# Patient Record
Sex: MALE | Race: BLACK OR AFRICAN AMERICAN
[De-identification: names, ages, dates, MRNs, and addresses within clinical notes are randomized per-mention and may not be internally consistent; named-entity substitution may affect disease eponyms.]

---

## 2022-10-12 ENCOUNTER — NURSE TRIAGE (OUTPATIENT)
Dept: OTHER | Facility: CLINIC | Age: 57
End: 2022-10-12

## 2022-10-12 NOTE — TELEPHONE ENCOUNTER
Location of patient: [address] [place] [address] triage as caller is not with patient. Daughter called but had no info, writer called wife. Received call from Mike Delvalle at [facility] with Red Flag Complaint. Subjective: Caller states \"his feet swell\"     Current Symptoms: Both legs mid calf to feet swell. Tingles off/on. NO open areas or weeping. No chest pain or shortness of breath. No history of heart, liver or kidney failure. No cancer history He has HTN and DM. Onset: 6 months ago; worsening for the past 3 months    Associated Symptoms: NA    Pain Severity: 0/10; not sure he is not with caller    Temperature: no fever     What has been tried: compression socks    LMP: NA Pregnant: NA    Recommended disposition: See in Office Within 2 Weeks has new patient appt in a month, advised caller we move up if possible, if not can take him to [facility]. Care advice provided, patient verbalizes understanding; denies any other questions or concerns; instructed to call back for any new or worsening symptoms. Extended hold for ECC message in chat. Attention Provider: Thank you for allowing me to participate in the care of your patient. The patient was connected to triage in response to information provided to the ECC. Please do not respond through this encounter as the response is not directed to a shared pool.         Reason for Disposition   [1] MILD swelling of both ankles (i.e., pedal edema) AND [2] is a chronic symptom (recurrent or ongoing AND present > 4 weeks)    Protocols used: Leg Swelling and Edema-ADULT-

## 2022-10-16 NOTE — PROGRESS NOTES
Michael Dominguez is an 62 y.o. male who presents to [facility]. He has not seen a physician in at least 10 years. He does not take any medications at this time. Has been seeing ophthalmologist for 4-5 months. He was told he has left retinal detachment and diabetic retinopathy based on clinical findings. Symptoms involve blurry vision and floaters since march. He has been getting eye shots which have been helping but still has persistent blurry vision. He reports his ophthalmologist wants to perform eye surgery and he is requesting medical clearance. In addition, feet and ankles were very swollen all summer long. Better with compression stockings. Patient weight lifts frequently. He takes multiple supplements: GF9. Took hgh prior. Ginko bilboa, Vit D, Testosterone booster, and a focus supplement. /93  POC A1c 7.5    Father with hx HTN and DM    Review of Systems   General/Constitutional:   No headache, fever, fatigue, weight loss or weight gain       Eyes:   + vision changes and blurry vision    Ears:    No pain, loss or changes in hearing     Neck:   No swelling, masses, stiffness, pain, or limited movement     Cardiac:    No chest pain      Respiratory:   No cough or shortness of breath     GI:   No nausea/vomiting, diarrhea, abdominal pain  :   No dysuria or  hematuria    Neurological:   No weakness, HA, dizziness  Skin: No rash     Current Medications  Current medications include:   Current Outpatient Medications   Medication Sig    ginkgo biloba (GINKOBA PO) Take  by mouth. OTHER HGH    OTHER GF9    TESTOSTERONE UNDECANOATE PO Take  by mouth.    vitamin E (AQUA GEMS) 268 mg (400 unit) capsule Take  by mouth daily. GINSENG PO Take  by mouth. cholecalciferol, vitamin D3, (VITAMIN D3 PO) Take  by mouth.    lisinopriL (PRINIVIL, ZESTRIL) 20 mg tablet Take 1 Tablet by mouth daily.  Indications: high blood pressure    metFORMIN (GLUCOPHAGE) 500 mg tablet Take 1 Tablet by mouth two (2) times daily (with meals) for 30 days. No current facility-administered medications for this visit. Allergies  No Known Allergies    Past Medical History  History reviewed. No pertinent past medical history. Past Surgical History   History reviewed. No pertinent surgical history. Family History  Family History   Problem Relation Age of Onset    Pancreatic Cancer Mother     Hypertension Father     High Cholesterol Father     Diabetes Father     Parkinsonism Father     Dementia Father     No Known Problems Sister     No Known Problems Maternal Grandmother     No Known Problems Maternal Grandfather     Heart Attack Paternal Grandmother     Heart Disease Paternal Grandmother     Heart Disease Paternal Grandfather      Social History  Social History     Socioeconomic History    Marital status: UNKNOWN     Spouse name: Not on file    Number of children: Not on file    Years of education: Not on file    Highest education level: Not on file   Occupational History    Not on file   Tobacco Use    Smoking status: Never    Smokeless tobacco: Never   Substance and Sexual Activity    Alcohol use: Yes    Drug use: Never    Sexual activity: Not on file   Other Topics Concern    Not on file   Social History Narrative    Not on file     Social Determinants of Health     Financial Resource Strain: Not on file   Food Insecurity: Not on file   Transportation Needs: Not on file   Physical Activity: Not on file   Stress: Not on file   Social Connections: Not on file   Intimate Partner Violence: Not on file   Housing Stability: Not on file     Immunizations  Immunization History   Administered Date(s) Administered    COVID-19, J&J, (age 18y+), IM, 0.5mL 07/13/2021     Objective   Vital Signs  Visit Vitals  BP (!) 183/93   Pulse 95   Temp 97.8 °F (36.6 °C) (Temporal)   Resp 17   Ht 5' 5\" (1.651 m)   Wt 180 lb (81.6 kg)   SpO2 96%   BMI 29.95 kg/m²     Physical Examination  GEN: No apparent distress. Alert and oriented.   EYES:  Pupils round and reactive to light; extraocular movements are intact. EAR: External ears are normal        LUNGS: Respirations unlabored; clear to auscultation bilaterally  CARDIOVASCULAR: Regular, rate, and rhythm without murmurs, gallops or rubs   ABDOMEN: Soft; nontender; nondistended; normoactive bowel sounds; no masses or organomegaly  NEUROLOGIC:  No focal neurologic deficits. Strength and sensation grossly intact. Coordination and gait grossly intact. EXT: Well perfused. 1+ pitting edema b/l  SKIN: No obvious rashes. Assessment:   Natalya Dial is a 62 y.o. here to establish to care after not seeing a PCP in over 10 years. He reports he has been following with an ophthalmologist for retinopathy and left retinal attachment and takes no medications but does take multiple supplements as listed above. He has been told his eye manifestations may be due to undiagnosed diabetes or HTN. Here in the office, he has a POC A1c of 7.5 and a BP of 183/93 and reports blurry vision. Plan:   1. Type 2 diabetes mellitus with other specified complication, without long-term current use of insulin (HCC)  - Metformin 500mg BID  - POC A1c 7.5 here in clinic. Patient with retinopathy likely secondary to diabetes and HTN    2. Hypertension, unspecified type  - Starting Lisinopril 20mg daily  - BP log for one week  - /93 in clinic with re-check  - Follow up in one week. Consider starting on combo pill lisinopril-hctz if BP consistently above 323 systolic    Counseled re: diet, exercise, healthy lifestyle as it pertains to these new diagnoses of HTN and DMII. We specifically spoke about decreasing sugary beverages, breads, and fast foods which he admits to consuming regularly. 3. Establishing care with new doctor, encounter for  - Drawing labs as below. Will follow up on these, especially kidney function, in terms of starting metformin.  - LIPID PANEL; Future  - METABOLIC PANEL, COMPREHENSIVE;  Future  - AMB POC HEMOGLOBIN J9L  - METABOLIC PANEL, COMPREHENSIVE  - LIPID PANEL    As mentioned above, follow up in 1 week to address DMII and HTN. Also check hearing at this visit as well as screen for memory issues and/or mood disorder as wife was concerned about mood swings and memory issues as of recent. I discussed the aforementioned diagnoses with the patient as well as the plan of care. All questions were answered and an AVS was provided.      I discussed this patient with Dr. Kei Rivera (Attending Physician)    Signed By:  Lonny Braswell, DO

## 2022-10-17 ENCOUNTER — OFFICE VISIT (OUTPATIENT)
Dept: FAMILY MEDICINE CLINIC | Age: 57
End: 2022-10-17
Payer: MEDICAID

## 2022-10-17 VITALS
HEIGHT: 65 IN | WEIGHT: 180 LBS | RESPIRATION RATE: 17 BRPM | DIASTOLIC BLOOD PRESSURE: 93 MMHG | OXYGEN SATURATION: 96 % | HEART RATE: 95 BPM | BODY MASS INDEX: 29.99 KG/M2 | SYSTOLIC BLOOD PRESSURE: 183 MMHG | TEMPERATURE: 97.8 F

## 2022-10-17 DIAGNOSIS — E11.69 TYPE 2 DIABETES MELLITUS WITH OTHER SPECIFIED COMPLICATION, WITHOUT LONG-TERM CURRENT USE OF INSULIN (HCC): ICD-10-CM

## 2022-10-17 DIAGNOSIS — I10 HYPERTENSION, UNSPECIFIED TYPE: Primary | ICD-10-CM

## 2022-10-17 DIAGNOSIS — Z76.89 ESTABLISHING CARE WITH NEW DOCTOR, ENCOUNTER FOR: ICD-10-CM

## 2022-10-17 LAB
COMMENT, HOLDF: NORMAL
HBA1C MFR BLD HPLC: 7.5 %
SAMPLES BEING HELD,HOLD: NORMAL

## 2022-10-17 PROCEDURE — 99214 OFFICE O/P EST MOD 30 MIN: CPT

## 2022-10-17 PROCEDURE — 3051F HG A1C>EQUAL 7.0%<8.0%: CPT

## 2022-10-17 PROCEDURE — 83036 HEMOGLOBIN GLYCOSYLATED A1C: CPT

## 2022-10-17 RX ORDER — LISINOPRIL 20 MG/1
20 TABLET ORAL DAILY
Qty: 30 TABLET | Refills: 0 | Status: SHIPPED | OUTPATIENT
Start: 2022-10-17 | End: 2022-10-24

## 2022-10-17 RX ORDER — VITAMIN E 268 MG
CAPSULE ORAL DAILY
COMMUNITY

## 2022-10-17 RX ORDER — METFORMIN HYDROCHLORIDE 500 MG/1
500 TABLET ORAL 2 TIMES DAILY WITH MEALS
Qty: 60 TABLET | Refills: 0 | Status: SHIPPED | OUTPATIENT
Start: 2022-10-17 | End: 2022-11-16

## 2022-10-17 RX ORDER — METFORMIN HYDROCHLORIDE 500 MG/1
500 TABLET, EXTENDED RELEASE ORAL
Qty: 30 TABLET | Refills: 0 | Status: SHIPPED | OUTPATIENT
Start: 2022-10-17 | End: 2022-10-17 | Stop reason: SINTOL

## 2022-10-17 NOTE — PATIENT INSTRUCTIONS
Please get a blood pressure cuff from your local pharmacy and check your blood pressure twice in the morning and twice in the evening. Check it once, wait 2 minutes, and check again (you will write down 4 total readings per day). Your A1c was also in the diabetic range. We are starting metformin today. Please take two pills per day (one in the morning and one in the evening). We will see you back in a week to go over your blood pressure log, labs, and to see if we need to change your medications.

## 2022-10-18 DIAGNOSIS — I10 HYPERTENSION, UNSPECIFIED TYPE: Primary | ICD-10-CM

## 2022-10-18 LAB
ALBUMIN SERPL-MCNC: 3.2 G/DL (ref 3.5–5)
ALBUMIN/GLOB SERPL: 0.8 {RATIO} (ref 1.1–2.2)
ALP SERPL-CCNC: 79 U/L (ref 45–117)
ALT SERPL-CCNC: 44 U/L (ref 12–78)
ANION GAP SERPL CALC-SCNC: 7 MMOL/L (ref 5–15)
AST SERPL-CCNC: 32 U/L (ref 15–37)
BILIRUB SERPL-MCNC: 0.4 MG/DL (ref 0.2–1)
BUN SERPL-MCNC: 27 MG/DL (ref 6–20)
BUN/CREAT SERPL: 13 (ref 12–20)
CALCIUM SERPL-MCNC: 8.6 MG/DL (ref 8.5–10.1)
CHLORIDE SERPL-SCNC: 107 MMOL/L (ref 97–108)
CHOLEST SERPL-MCNC: 301 MG/DL
CO2 SERPL-SCNC: 28 MMOL/L (ref 21–32)
CREAT SERPL-MCNC: 2.09 MG/DL (ref 0.7–1.3)
GLOBULIN SER CALC-MCNC: 4.1 G/DL (ref 2–4)
GLUCOSE SERPL-MCNC: 191 MG/DL (ref 65–100)
HDLC SERPL-MCNC: 42 MG/DL
HDLC SERPL: 7.2 {RATIO} (ref 0–5)
LDLC SERPL CALC-MCNC: 231.8 MG/DL (ref 0–100)
POTASSIUM SERPL-SCNC: 4.1 MMOL/L (ref 3.5–5.1)
PROT SERPL-MCNC: 7.3 G/DL (ref 6.4–8.2)
SODIUM SERPL-SCNC: 142 MMOL/L (ref 136–145)
TRIGL SERPL-MCNC: 136 MG/DL (ref ?–150)
VLDLC SERPL CALC-MCNC: 27.2 MG/DL

## 2022-10-19 ENCOUNTER — TELEPHONE (OUTPATIENT)
Dept: FAMILY MEDICINE CLINIC | Age: 57
End: 2022-10-19

## 2022-10-19 DIAGNOSIS — R94.4 DECREASED GFR: Primary | ICD-10-CM

## 2022-10-19 DIAGNOSIS — I10 HYPERTENSION, UNSPECIFIED TYPE: ICD-10-CM

## 2022-10-19 RX ORDER — ROSUVASTATIN CALCIUM 20 MG/1
20 TABLET, COATED ORAL
Qty: 30 TABLET | Refills: 0 | Status: SHIPPED | OUTPATIENT
Start: 2022-10-19

## 2022-10-19 NOTE — TELEPHONE ENCOUNTER
I have spoken with Mr. Saul Mcgee via telephone for an 8-minute call ending at 6:07 PM on 10/19 regarding his labwork below. We discussed the major findings: elevated cholesterol and poor kidney function. I informed him that I will be sending crestor 20mg to his pharmacy to be taken once daily. He will also be picking up his blood pressure cuff when he gets his crestor. Patient has an appointment on Monday, Oct 24th with Dr. Chayito Sawyer. I informed patient on phone that we would likely get more testing with urine microalbumin cr ratio. In addition, will likely add on amlodipine to his new medication of lisinopril. Patient was informed of all this and all questions were answered. Labs as below:     The 10-year ASCVD risk score (Theresa CARRANZA, et al., 2019) is: 15.8%   Lab Results   Component Value Date/Time    Sodium 142 10/17/2022 04:00 PM    Potassium 4.1 10/17/2022 04:00 PM    Chloride 107 10/17/2022 04:00 PM    CO2 28 10/17/2022 04:00 PM    Anion gap 7 10/17/2022 04:00 PM    Glucose 191 (H) 10/17/2022 04:00 PM    BUN 27 (H) 10/17/2022 04:00 PM    Creatinine 2.09 (H) 10/17/2022 04:00 PM    BUN/Creatinine ratio 13 10/17/2022 04:00 PM    Calcium 8.6 10/17/2022 04:00 PM    Bilirubin, total 0.4 10/17/2022 04:00 PM    Alk.  phosphatase 79 10/17/2022 04:00 PM    Protein, total 7.3 10/17/2022 04:00 PM    Albumin 3.2 (L) 10/17/2022 04:00 PM    Globulin 4.1 (H) 10/17/2022 04:00 PM    A-G Ratio 0.8 (L) 10/17/2022 04:00 PM    ALT (SGPT) 44 10/17/2022 04:00 PM    AST (SGOT) 32 10/17/2022 04:00 PM      Lab Results   Component Value Date/Time    Cholesterol, total 301 (H) 10/17/2022 04:00 PM    HDL Cholesterol 42 10/17/2022 04:00 PM    LDL, calculated 231.8 (H) 10/17/2022 04:00 PM    VLDL, calculated 27.2 10/17/2022 04:00 PM    Triglyceride 136 10/17/2022 04:00 PM    CHOL/HDL Ratio 7.2 (H) 10/17/2022 04:00 PM    Last Point of Care HGB A1C  Hemoglobin A1c (POC)   Date Value Ref Range Status   10/17/2022 7.5 % Final

## 2022-10-24 ENCOUNTER — OFFICE VISIT (OUTPATIENT)
Dept: FAMILY MEDICINE CLINIC | Age: 57
End: 2022-10-24
Payer: COMMERCIAL

## 2022-10-24 VITALS
SYSTOLIC BLOOD PRESSURE: 160 MMHG | HEIGHT: 65 IN | BODY MASS INDEX: 29.82 KG/M2 | TEMPERATURE: 97.8 F | WEIGHT: 179 LBS | RESPIRATION RATE: 17 BRPM | OXYGEN SATURATION: 98 % | DIASTOLIC BLOOD PRESSURE: 88 MMHG | HEART RATE: 93 BPM

## 2022-10-24 DIAGNOSIS — I10 HYPERTENSION, UNSPECIFIED TYPE: Primary | ICD-10-CM

## 2022-10-24 DIAGNOSIS — R94.4 DECREASED GFR: ICD-10-CM

## 2022-10-24 DIAGNOSIS — E11.69 TYPE 2 DIABETES MELLITUS WITH OTHER SPECIFIED COMPLICATION, WITHOUT LONG-TERM CURRENT USE OF INSULIN (HCC): ICD-10-CM

## 2022-10-24 PROCEDURE — 3079F DIAST BP 80-89 MM HG: CPT

## 2022-10-24 PROCEDURE — 3051F HG A1C>EQUAL 7.0%<8.0%: CPT

## 2022-10-24 PROCEDURE — 99214 OFFICE O/P EST MOD 30 MIN: CPT

## 2022-10-24 PROCEDURE — 3077F SYST BP >= 140 MM HG: CPT

## 2022-10-24 RX ORDER — LISINOPRIL 40 MG/1
40 TABLET ORAL DAILY
Qty: 90 TABLET | Refills: 2 | Status: SHIPPED | OUTPATIENT
Start: 2022-10-24

## 2022-10-24 NOTE — PROGRESS NOTES
[address] [address]   Office [phone], Fax [phone]    Subjective:     Chief Complaint   Patient presents with    Hypertension         HPI:  Ameya Meléndez is a 62 y.o. male with a history of HTN, T2DM, CKD3B, that presents for: Follow Up for HTN and DM2    HTN:  - Started on Lisinopril 20mg daily after elevated pressures noted in clinic last week. - Unable to take home BP as he has not picked up BP cuff yet  - denies any headaches, dizziness, SOB, chest pain    DM2:   - Recently diagnosed, A1c of 7.5 last week, recently started on Metformin 500mg BID  - follows with ophtho for DM retinopathy and detached retina   - experienced mild diarrhea with Metformin which is now improving, no other issues. Health Maintenance:  Health Maintenance Due   Topic Date Due    Hepatitis C Screening  Never done    Pneumococcal 0-64 years (1 - PCV) Never done    DTaP/Tdap/Td series (1 - Tdap) Never done    Colorectal Cancer Screening Combo  Never done    Shingrix Vaccine Age 50> (1 of 2) Never done    COVID-19 Vaccine (2 - Booster for [employer] series) 09/07/2021    Flu Vaccine (1) Never done          Past Medical Hx  I personally reviewed. History reviewed. No pertinent past medical history. SocHx   I personally reviewed.   Social History     Socioeconomic History    Marital status: UNKNOWN     Spouse name: Not on file    Number of children: Not on file    Years of education: Not on file    Highest education level: Not on file   Occupational History    Not on file   Tobacco Use    Smoking status: Never    Smokeless tobacco: Never   Substance and Sexual Activity    Alcohol use: Yes    Drug use: Never    Sexual activity: Not on file   Other Topics Concern    Not on file   Social History Narrative    Not on file     Social Determinants of Health     Financial Resource Strain: Not on file   Food Insecurity: Not on file   Transportation Needs: Not on file   Physical Activity: Not on file   Stress: Not on file   Social Connections: Not on file   Intimate Partner Violence: Not on file   Housing Stability: Not on file        Allergies  I personally reviewed. No Known Allergies     Medications  I personally reviewed. Current Outpatient Medications on File Prior to Visit   Medication Sig Dispense Refill    OTHER EU9 for focus and memory      rosuvastatin (CRESTOR) 20 mg tablet Take 1 Tablet by mouth nightly. Indications: high cholesterol and high triglycerides 30 Tablet 0    ginkgo biloba (GINKOBA PO) Take  by mouth. OTHER HGH      OTHER GF9      TESTOSTERONE UNDECANOATE PO Take  by mouth.      vitamin E (AQUA GEMS) 268 mg (400 unit) capsule Take  by mouth daily. GINSENG PO Take  by mouth. cholecalciferol, vitamin D3, (VITAMIN D3 PO) Take  by mouth.      metFORMIN (GLUCOPHAGE) 500 mg tablet Take 1 Tablet by mouth two (2) times daily (with meals) for 30 days. 60 Tablet 0     No current facility-administered medications on file prior to visit. ROS:   Review of Systems   Constitutional:  Negative for chills, fever and weight loss. Eyes:  Negative for blurred vision. Respiratory:  Negative for shortness of breath. Cardiovascular:  Negative for chest pain and palpitations. Gastrointestinal:  Negative for abdominal pain, diarrhea, nausea and vomiting. Genitourinary:  Negative for dysuria and frequency. Skin:  Negative for rash. Neurological:  Negative for dizziness, tingling, loss of consciousness and weakness. Objective:   Vitals  I personally reviewed. Visit Vitals  BP (!) 160/88   Pulse 93   Temp 97.8 °F (36.6 °C) (Temporal)   Resp 17   Ht 5' 5\" (1.651 m)   Wt 179 lb (81.2 kg)   SpO2 98%   BMI 29.79 kg/m²        Physical Exam:    Physical Exam  Constitutional:       Appearance: Normal appearance. HENT:      Head: Normocephalic and atraumatic.       Right Ear: External ear normal.      Left Ear: External ear normal.   Cardiovascular:      Rate and Rhythm: Normal rate and regular rhythm. Pulses: Normal pulses. Heart sounds: Normal heart sounds. No murmur heard. Pulmonary:      Effort: Pulmonary effort is normal. No respiratory distress. Breath sounds: Normal breath sounds. No stridor. No wheezing. Abdominal:      General: Abdomen is flat. Palpations: Abdomen is soft. Tenderness: There is no abdominal tenderness. Skin:     General: Skin is warm and dry. Findings: No rash. Neurological:      General: No focal deficit present. Mental Status: He is alert and oriented to person, place, and time. Assessment/Plan:     60yo male with history of HTN and CKD3b presents for follow up for HTN as well as newly diagnosed T2DM. Diagnoses and all orders for this visit:    1. Hypertension, unspecified type  Assessment & Plan:  Recently started on Lisinopril 20, however pressures remain above goal. No home numbers to compare. Will increase to Lisinopril 40mg today. Patient instructed to purchase home BP cuff and keep log. Plan to follow up in 2 weeks with log. BMP today after starting Lisinopril, and urine microalbumin. Orders:  -     lisinopriL (PRINIVIL, ZESTRIL) 40 mg tablet; Take 1 Tablet by mouth daily.  -     MICROALBUMIN, UR, RAND W/ MICROALB/CREAT RATIO; Future    2. Decreased GFR  -     METABOLIC PANEL, BASIC; Future  -     MICROALBUMIN, UR, RAND W/ MICROALB/CREAT RATIO; Future    3. Type 2 diabetes mellitus with other specified complication, without long-term current use of insulin (HCC)  Assessment & Plan:  A1c 7.5 recently diagnosed, started on Metformin and now tolerating well. Continue Metformin, will not increase dose at this time. Will plan follow up for A1c check in 2 months, and consider increase dose at that time if necessary. Follow-up and Dispositions    Return in about 2 weeks (around 11/7/2022) for Follow Up for HTN, increased lisinopril .           Pt was discussed with Dr Maricarmen Grewal (attending physician). I have reviewed patient medical and social history and medications. I have reviewed pertinent labs results and other data. I have discussed the diagnosis with the patient and the intended plan as seen in the above orders. The patient has received an after-visit summary and questions were answered concerning future plans. I have discussed medication side effects and warnings with the patient as well.     Sushila Steel MD  Resident [facility]  10/27/22

## 2022-10-24 NOTE — PROGRESS NOTES
Chief Complaint   Patient presents with    Hypertension     1. Have you been to the ER, urgent care clinic since your last visit? Hospitalized since your last visit? N/A    2. Have you seen or consulted any other health care providers outside of the [address] [place] [address] since your last visit? Include any pap smears or colon screening.  N/A

## 2022-10-25 LAB
ANION GAP SERPL CALC-SCNC: 5 MMOL/L (ref 5–15)
BUN SERPL-MCNC: 38 MG/DL (ref 6–20)
BUN/CREAT SERPL: 17 (ref 12–20)
CALCIUM SERPL-MCNC: 9.4 MG/DL (ref 8.5–10.1)
CHLORIDE SERPL-SCNC: 108 MMOL/L (ref 97–108)
CO2 SERPL-SCNC: 28 MMOL/L (ref 21–32)
CREAT SERPL-MCNC: 2.18 MG/DL (ref 0.7–1.3)
CREAT UR-MCNC: 231 MG/DL
GLUCOSE SERPL-MCNC: 165 MG/DL (ref 65–100)
MICROALBUMIN UR-MCNC: 630 MG/DL
MICROALBUMIN/CREAT UR-RTO: 2727 MG/G (ref 0–30)
POTASSIUM SERPL-SCNC: 4.3 MMOL/L (ref 3.5–5.1)
SODIUM SERPL-SCNC: 141 MMOL/L (ref 136–145)

## 2022-10-27 PROBLEM — E11.9 DIABETES MELLITUS (HCC): Status: ACTIVE | Noted: 2022-10-27

## 2022-10-27 PROBLEM — I10 HYPERTENSION: Status: ACTIVE | Noted: 2022-10-27

## 2022-10-27 PROBLEM — R94.4 DECREASED GFR: Status: ACTIVE | Noted: 2022-10-27

## 2022-10-28 NOTE — ASSESSMENT & PLAN NOTE
A1c 7.5 recently diagnosed, started on Metformin and now tolerating well. Continue Metformin, will not increase dose at this time. Will plan follow up for A1c check in 2 months, and consider increase dose at that time if necessary.

## 2022-10-28 NOTE — ASSESSMENT & PLAN NOTE
Recently started on Lisinopril 20, however pressures remain above goal. No home numbers to compare. Will increase to Lisinopril 40mg today. Patient instructed to purchase home BP cuff and keep log. Plan to follow up in 2 weeks with log. BMP today after starting Lisinopril, and urine microalbumin.

## 2022-11-10 ENCOUNTER — OFFICE VISIT (OUTPATIENT)
Dept: FAMILY MEDICINE CLINIC | Age: 57
End: 2022-11-10
Payer: COMMERCIAL

## 2022-11-10 VITALS
RESPIRATION RATE: 16 BRPM | OXYGEN SATURATION: 97 % | SYSTOLIC BLOOD PRESSURE: 189 MMHG | WEIGHT: 180 LBS | HEART RATE: 79 BPM | DIASTOLIC BLOOD PRESSURE: 90 MMHG | BODY MASS INDEX: 29.99 KG/M2 | HEIGHT: 65 IN

## 2022-11-10 DIAGNOSIS — I10 HYPERTENSION, UNSPECIFIED TYPE: Primary | ICD-10-CM

## 2022-11-10 DIAGNOSIS — R79.89 ELEVATED SERUM CREATININE: ICD-10-CM

## 2022-11-10 DIAGNOSIS — R80.9 ALBUMINURIA: ICD-10-CM

## 2022-11-10 DIAGNOSIS — E11.69 TYPE 2 DIABETES MELLITUS WITH OTHER SPECIFIED COMPLICATION, WITHOUT LONG-TERM CURRENT USE OF INSULIN (HCC): ICD-10-CM

## 2022-11-10 DIAGNOSIS — R94.4 DECREASED GFR: ICD-10-CM

## 2022-11-10 LAB
ANION GAP SERPL CALC-SCNC: 5 MMOL/L (ref 5–15)
BUN SERPL-MCNC: 50 MG/DL (ref 6–20)
BUN/CREAT SERPL: 27 (ref 12–20)
CALCIUM SERPL-MCNC: 9.3 MG/DL (ref 8.5–10.1)
CHLORIDE SERPL-SCNC: 110 MMOL/L (ref 97–108)
CO2 SERPL-SCNC: 27 MMOL/L (ref 21–32)
CREAT SERPL-MCNC: 1.82 MG/DL (ref 0.7–1.3)
GLUCOSE SERPL-MCNC: 105 MG/DL (ref 65–100)
POTASSIUM SERPL-SCNC: 5.2 MMOL/L (ref 3.5–5.1)
SODIUM SERPL-SCNC: 142 MMOL/L (ref 136–145)

## 2022-11-10 PROCEDURE — 3051F HG A1C>EQUAL 7.0%<8.0%: CPT

## 2022-11-10 PROCEDURE — 3079F DIAST BP 80-89 MM HG: CPT

## 2022-11-10 PROCEDURE — 99214 OFFICE O/P EST MOD 30 MIN: CPT

## 2022-11-10 PROCEDURE — 3077F SYST BP >= 140 MM HG: CPT

## 2022-11-10 RX ORDER — PREDNISOLONE ACETATE 10 MG/ML
SUSPENSION/ DROPS OPHTHALMIC
COMMUNITY
Start: 2022-11-02

## 2022-11-10 RX ORDER — OFLOXACIN 3 MG/ML
SOLUTION/ DROPS OPHTHALMIC
COMMUNITY
Start: 2022-11-02

## 2022-11-10 RX ORDER — DORZOLAMIDE HYDROCHLORIDE AND TIMOLOL MALEATE 20; 5 MG/ML; MG/ML
SOLUTION/ DROPS OPHTHALMIC
COMMUNITY
Start: 2022-11-08

## 2022-11-10 RX ORDER — GLIPIZIDE 5 MG/1
2.5 TABLET ORAL ONCE
Qty: 30 TABLET | Refills: 3 | Status: SHIPPED | OUTPATIENT
Start: 2022-11-10 | End: 2022-11-10

## 2022-11-10 RX ORDER — ATROPINE SULFATE 10 MG/ML
SOLUTION/ DROPS OPHTHALMIC
COMMUNITY
Start: 2022-11-08

## 2022-11-10 RX ORDER — AMLODIPINE BESYLATE 5 MG/1
5 TABLET ORAL DAILY
Qty: 30 TABLET | Refills: 3 | Status: SHIPPED | OUTPATIENT
Start: 2022-11-10

## 2022-11-10 NOTE — PROGRESS NOTES
Devyn Sharif is a 62 y.o. male    Chief Complaint   Patient presents with    Hypertension       1. Have you been to the ER, urgent care clinic since your last visit? Hospitalized since your last visit? No  2. Have you seen or consulted any other health care providers outside of the [address] [place] [address] since your last visit? Include any pap smears or colon screening.  No    Visit Vitals  BP (!) 189/90 (BP 1 Location: Left upper arm, BP Patient Position: Sitting)   Pulse 79   Resp 16   Ht 5' 5\" (1.651 m)   Wt 180 lb (81.6 kg)   SpO2 97%   BMI 29.95 kg/m²     3 most recent PHQ Screens 10/24/2022   Little interest or pleasure in doing things Not at all   Feeling down, depressed, irritable, or hopeless Not at all   Total Score PHQ 2 0     Health Maintenance Due   Topic Date Due    Hepatitis C Screening  Never done    Pneumococcal 0-64 years (1 - PCV) Never done    Foot Exam Q1  Never done    Eye Exam Retinal or Dilated  Never done    Hepatitis B Vaccine (1 of 3 - Risk 3-dose series) Never done    DTaP/Tdap/Td series (1 - Tdap) Never done    Colorectal Cancer Screening Combo  Never done    Shingrix Vaccine Age 50> (1 of 2) Never done    COVID-19 Vaccine (2 - Booster for [facility] series) 09/07/2021    Flu Vaccine (1) Never done

## 2022-11-10 NOTE — PROGRESS NOTES
Subjective   CC:   Chief Complaint   Patient presents with    Hypertension      Sparkle Tracey is a 62 y.o. male with hx of DM, HLD, and HTN (all diagnosed one month prior) who presents for follow up for poorly controlled HTN. Was seen two weeks ago with an increase in lisinopril from 20mg to 40mg. He comes in today with his BP log as below showing persistently elevated pressures despite being on lisinopril 40mg. He denies any headaches, chest pain, difficulty breathing, leg swelling, back pain today. Pt had eye surgery on Monday for retinal detachment. No complications. Will have follow up on next Wednesday. 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 Of note, he and his wife report increased agitation and outbursts of anger as of recent. Patient reports he feels stressed and cooped up not being able to go to work. No fever, HA, CP, cough, SOB, n/v, abd pain, GI/urinary issues noted. Past Medical History  No past medical history on file. Current Medications  Current Outpatient Medications   Medication Sig Dispense Refill    prednisoLONE acetate (PRED FORTE) 1 % ophthalmic suspension 1 DROP LEFT EYE FOUR TIMES A DAY 30 DAYS      ofloxacin (OCUFLOX) 0.3 % ophthalmic solution 1 DROP INTO LEFT EYE OPHTHALMIC FOUR TIMES A DAY 7 DAY(S)      dorzolamide-timoloL (COSOPT) 22.3-6.8 mg/mL ophthalmic solution INSTILL 1 DROP LEFT EYE TWICE A DAY 30 DAYS      atropine 1 % ophthalmic solution       amLODIPine (NORVASC) 5 mg tablet Take 1 Tablet by mouth daily. 30 Tablet 3    glipiZIDE (GLUCOTROL) 5 mg tablet Take 0.5 Tablets by mouth once for 1 dose. 30 Tablet 3    OTHER EU9 for focus and memory      lisinopriL (PRINIVIL, ZESTRIL) 40 mg tablet Take 1 Tablet by mouth daily. 90 Tablet 2    rosuvastatin (CRESTOR) 20 mg tablet Take 1 Tablet by mouth nightly. Indications: high cholesterol and high triglycerides 30 Tablet 0    ginkgo biloba (GINKOBA PO) Take  by mouth. OTHER HGH      OTHER GF9      TESTOSTERONE UNDECANOATE PO Take  by mouth.      vitamin E (AQUA GEMS) 268 mg (400 unit) capsule Take  by mouth daily. GINSENG PO Take  by mouth. cholecalciferol, vitamin D3, (VITAMIN D3 PO) Take  by mouth.      metFORMIN (GLUCOPHAGE) 500 mg tablet Take 1 Tablet by mouth two (2) times daily (with meals) for 30 days. 60 Tablet 0       Objective   Vital Signs  Visit Vitals  BP (!) 189/90 (BP 1 Location: Left upper arm, BP Patient Position: Sitting)   Pulse 79   Resp 16   Ht 5' 5\" (1.651 m)   Wt 180 lb (81.6 kg)   SpO2 97%   BMI 29.95 kg/m²     Recheck of BP was 926 systolic. Labs  Lab Results   Component Value Date/Time    Sodium 141 10/24/2022 03:36 PM    Potassium 4.3 10/24/2022 03:36 PM    Chloride 108 10/24/2022 03:36 PM    CO2 28 10/24/2022 03:36 PM    Anion gap 5 10/24/2022 03:36 PM    Glucose 165 (H) 10/24/2022 03:36 PM    BUN 38 (H) 10/24/2022 03:36 PM    Creatinine 2.18 (H) 10/24/2022 03:36 PM    BUN/Creatinine ratio 17 10/24/2022 03:36 PM    Calcium 9.4 10/24/2022 03:36 PM        Lab Results   Component Value Date/Time    Cholesterol, total 301 (H) 10/17/2022 04:00 PM    HDL Cholesterol 42 10/17/2022 04:00 PM    LDL, calculated 231.8 (H) 10/17/2022 04:00 PM    VLDL, calculated 27.2 10/17/2022 04:00 PM    Triglyceride 136 10/17/2022 04:00 PM    CHOL/HDL Ratio 7.2 (H) 10/17/2022 04:00 PM      Physical Examination  Physical Exam   Cardio: Regular rate/rhythm, no murmurs  Pulm: Clear b/l, no wheezing, no crackles, no ronchi  Abd: Soft, non-tender, non-distended  Lower Ext: 1+ pitting edema on b/l LE, no calf tenderness, sensation intact b/l    Diabetic foot exam nml    Assessment and Plan   Gama Martinez is a 62 y.o. who is here for follow up for poorly controlled HTN. BP persistently elevated on lisinopril 40mg, adding amlodipine 5mg daily today.  Pt is to continue to log BP readings twice daily. Follow up in 2 weeks    Kidney function is also poor with repeat BMP 2 weeks ago again showing elevated Cr at 2.18, GFR at 34, and microalbumin at 630. Although we don't have 3 months of data, patient seems to have CKD stage 3b. BP and DM control is of utmost importance, specifically HTN. Referring to nephrology to establish care and consider alternative or more aggressive means of better controlling BP and/or diabetes in setting of kidney disease. Patient was also encouraged to stop taking supplements considering lack of regulation and possibly increased stress on kidneys. DMII: POC a1c was 7.5 on 10/17. Patient was started on Metformin 500 BID. However, in light of persistently poor kidney function as seen on BMP's, discontinuing metformin and starting glipizide today at 2.5mg daily. Will likely titrate up to 5mg each morning at subsequent visit. Can consider adding a GLP-1 agonist in the future. Provided with information regarding healthy eating and low-moderate intensity cardio exercise. Spoke extensively about stress reduction including breathing techniques and outdoor walks. Patient feels going back to work will be helpful which I agree. Will re-assess at next visit. 1. Hypertension, unspecified type  - amLODIPine (NORVASC) 5 mg tablet; Take 1 Tablet by mouth daily. Dispense: 30 Tablet; Refill: 3  - REFERRAL TO NEPHROLOGY  - METABOLIC PANEL, BASIC; Future  - METABOLIC PANEL, BASIC    2. Decreased GFR  - REFERRAL TO NEPHROLOGY  - METABOLIC PANEL, BASIC; Future  - METABOLIC PANEL, BASIC    3. Albuminuria  - REFERRAL TO NEPHROLOGY  - METABOLIC PANEL, BASIC; Future  - METABOLIC PANEL, BASIC    4. Elevated serum creatinine  - REFERRAL TO NEPHROLOGY  - METABOLIC PANEL, BASIC; Future  - METABOLIC PANEL, BASIC    5. Type 2 diabetes mellitus with other specified complication, without long-term current use of insulin (HCC)  - glipiZIDE (GLUCOTROL) 5 mg tablet;  Take 0.5 Tablets by mouth once for 1 dose. Dispense: 30 Tablet; Refill: 3  - REFERRAL TO NEPHROLOGY  - METABOLIC PANEL, BASIC; Future  -  DIABETES FOOT EXAM  - METABOLIC PANEL, BASIC     Urgent consultation with the nearest Emergency Department is strongly recommended if condition worsens. Patient counseled on precautions including but not limited to: persistent BP readings above 132 systolic, headaches, acute vision changes, chest pain, shortness of breath.     I discussed this patient with Dr. Mary Wray (Attending Physician)     Signed By:  Saul Calderón DO  Family Medicine Resident

## 2022-11-15 NOTE — PROGRESS NOTES
I saw and evaluated the patient, performing the key elements of the service. I discussed the findings, assessment and plan with the resident and agree with the resident's findings and plan as documented in the resident's note. Will d/c metformin and start glipizide given GFR close to 30.  Referral to nephrologist.

## 2022-11-16 DIAGNOSIS — I10 HYPERTENSION, UNSPECIFIED TYPE: ICD-10-CM

## 2022-11-16 NOTE — TELEPHONE ENCOUNTER
Eileen Hollis  Pt called requesting his med refill for lisinopril and rosuvastatin.   Please and thank you      -Nadine Patel

## 2022-11-17 DIAGNOSIS — I10 HYPERTENSION, UNSPECIFIED TYPE: ICD-10-CM

## 2022-11-22 RX ORDER — ROSUVASTATIN CALCIUM 20 MG/1
20 TABLET, COATED ORAL
Qty: 30 TABLET | Refills: 0 | Status: SHIPPED | OUTPATIENT
Start: 2022-11-22

## 2022-11-23 ENCOUNTER — OFFICE VISIT (OUTPATIENT)
Dept: FAMILY MEDICINE CLINIC | Age: 57
End: 2022-11-23
Payer: COMMERCIAL

## 2022-11-23 VITALS
WEIGHT: 181 LBS | RESPIRATION RATE: 18 BRPM | HEART RATE: 93 BPM | OXYGEN SATURATION: 98 % | SYSTOLIC BLOOD PRESSURE: 189 MMHG | TEMPERATURE: 98.5 F | HEIGHT: 65 IN | BODY MASS INDEX: 30.16 KG/M2 | DIASTOLIC BLOOD PRESSURE: 86 MMHG

## 2022-11-23 DIAGNOSIS — E11.69 TYPE 2 DIABETES MELLITUS WITH OTHER SPECIFIED COMPLICATION, WITHOUT LONG-TERM CURRENT USE OF INSULIN (HCC): ICD-10-CM

## 2022-11-23 DIAGNOSIS — F41.9 MILD ANXIETY: ICD-10-CM

## 2022-11-23 DIAGNOSIS — R94.4 DECREASED GFR: ICD-10-CM

## 2022-11-23 DIAGNOSIS — I10 HYPERTENSION, UNSPECIFIED TYPE: Primary | ICD-10-CM

## 2022-11-23 PROCEDURE — 3077F SYST BP >= 140 MM HG: CPT

## 2022-11-23 PROCEDURE — 99214 OFFICE O/P EST MOD 30 MIN: CPT

## 2022-11-23 PROCEDURE — 3079F DIAST BP 80-89 MM HG: CPT

## 2022-11-23 PROCEDURE — 3051F HG A1C>EQUAL 7.0%<8.0%: CPT

## 2022-11-23 RX ORDER — AMLODIPINE BESYLATE 10 MG/1
10 TABLET ORAL DAILY
Qty: 30 TABLET | Refills: 0 | Status: SHIPPED | OUTPATIENT
Start: 2022-11-23

## 2022-11-23 RX ORDER — GLIPIZIDE 5 MG/1
5 TABLET ORAL
Qty: 90 TABLET | Refills: 3 | Status: SHIPPED | OUTPATIENT
Start: 2022-11-23

## 2022-11-23 RX ORDER — ROSUVASTATIN CALCIUM 20 MG/1
20 TABLET, COATED ORAL
Qty: 30 TABLET | Refills: 0 | OUTPATIENT
Start: 2022-11-23

## 2022-11-23 RX ORDER — HYDROCHLOROTHIAZIDE 25 MG/1
25 TABLET ORAL DAILY
Qty: 30 TABLET | Refills: 3 | Status: SHIPPED | OUTPATIENT
Start: 2022-11-23

## 2022-11-23 NOTE — PROGRESS NOTES
Subjective   CC:   Chief Complaint   Patient presents with    Follow Up Chronic Condition     HTN and DM follow up     Minnette Hodgkin is a 62 y.o. male who presents for HTN and DM follow-up. On 11/10, metformin was stopped and glipizide was started at 2.5mg daily. For HTN, was started on amlodipine 5mg daily. No issues with any medications thus-far. Has been checking BP and consistently gets systolic readings in the 541'W. Did not bring log with him today. Reports he has been less stressed recently as he has gotten back to exercising more frequently. Of note, labs from last visit showed an increased potassium level of 5.2. No fever, HA, CP, new vision issues. Past Medical History  No past medical history on file. Current Medications  Current Outpatient Medications   Medication Sig Dispense Refill    hydroCHLOROthiazide (HYDRODIURIL) 25 mg tablet Take 1 Tablet by mouth daily. 30 Tablet 3    amLODIPine (NORVASC) 10 mg tablet Take 1 Tablet by mouth daily. 30 Tablet 0    rosuvastatin (CRESTOR) 20 mg tablet TAKE 1 TABLET BY MOUTH NIGHTLY. INDICATIONS: HIGH CHOLESTEROL AND HIGH TRIGLYCERIDES 30 Tablet 0    prednisoLONE acetate (PRED FORTE) 1 % ophthalmic suspension 1 DROP LEFT EYE FOUR TIMES A DAY 30 DAYS      atropine 1 % ophthalmic solution       amLODIPine (NORVASC) 5 mg tablet Take 1 Tablet by mouth daily. 30 Tablet 3    lisinopriL (PRINIVIL, ZESTRIL) 40 mg tablet Take 1 Tablet by mouth daily. 90 Tablet 2    TESTOSTERONE UNDECANOATE PO Take  by mouth.      vitamin E (AQUA GEMS) 268 mg (400 unit) capsule Take  by mouth daily. cholecalciferol, vitamin D3, (VITAMIN D3 PO) Take  by mouth.       ofloxacin (OCUFLOX) 0.3 % ophthalmic solution 1 DROP INTO LEFT EYE OPHTHALMIC FOUR TIMES A DAY 7 DAY(S) (Patient not taking: Reported on 11/23/2022)      dorzolamide-timoloL (COSOPT) 22.3-6.8 mg/mL ophthalmic solution INSTILL 1 DROP LEFT EYE TWICE A DAY 30 DAYS (Patient not taking: Reported on 11/23/2022)      OTHER EU9 for focus and memory (Patient not taking: Reported on 11/23/2022)      ginkgo biloba (GINKOBA PO) Take  by mouth. (Patient not taking: Reported on 11/23/2022)      OTHER HGH (Patient not taking: Reported on 11/23/2022)      OTHER GF9 (Patient not taking: Reported on 11/23/2022)      GINSENG PO Take  by mouth. (Patient not taking: Reported on 11/23/2022)         Objective   Vital Signs  Visit Vitals  BP (!) 189/86 (BP 1 Location: Right arm, BP Patient Position: Sitting, BP Cuff Size: Large adult)   Pulse 93   Temp 98.5 °F (36.9 °C) (Oral)   Resp 18   Ht 5' 5\" (1.651 m)   Wt 181 lb (82.1 kg)   SpO2 98%   BMI 30.12 kg/m²     Labs  Lab Results   Component Value Date/Time    Sodium 142 11/10/2022 10:25 AM    Potassium 5.2 (H) 11/10/2022 10:25 AM    Chloride 110 (H) 11/10/2022 10:25 AM    CO2 27 11/10/2022 10:25 AM    Anion gap 5 11/10/2022 10:25 AM    Glucose 105 (H) 11/10/2022 10:25 AM    BUN 50 (H) 11/10/2022 10:25 AM    Creatinine 1.82 (H) 11/10/2022 10:25 AM    BUN/Creatinine ratio 27 (H) 11/10/2022 10:25 AM    Calcium 9.3 11/10/2022 10:25 AM        Lab Results   Component Value Date/Time    Cholesterol, total 301 (H) 10/17/2022 04:00 PM    HDL Cholesterol 42 10/17/2022 04:00 PM    LDL, calculated 231.8 (H) 10/17/2022 04:00 PM    VLDL, calculated 27.2 10/17/2022 04:00 PM    Triglyceride 136 10/17/2022 04:00 PM    CHOL/HDL Ratio 7.2 (H) 10/17/2022 04:00 PM      PHQ-9: 0  JACI-7: 9    Physical Examination  Cardio: Regular rate/rhythm, no murmurs  Pulm: Clear b/l, no wheezing, no crackles, no ronchi  Lower Ext: No edema, no calf tenderness, sensation intact b/l    Assessment and Plan   Ramón Barreto is a 62 y.o. who is here for HTN and DM follow up. BP is poorly controlled and still severely elevated despite being now on lisinopril 40mg as well as amlodipine 5mg daily. Repeating BMP today considering elevated potassium of 5.2 at last visit (likely 2/2 lisinopril and poor kidney function).  Pt awaiting nephro appt which is on Feb 1st.    1. Hypertension, unspecified type: Remains poorly controlled. Adding HCTZ 25mg daily and increasing amlodipine from 5 to 10mg. - METABOLIC PANEL, BASIC; Future  - hydroCHLOROthiazide (HYDRODIURIL) 25 mg tablet; Take 1 Tablet by mouth daily. Dispense: 30 Tablet; Refill: 3  - amLODIPine (NORVASC) 10 mg tablet; Take 1 Tablet by mouth daily. Dispense: 30 Tablet; Refill: 0  - METABOLIC PANEL, BASIC    2. Decreased GFR  - METABOLIC PANEL, BASIC; Future  - METABOLIC PANEL, BASIC    3. Type 2 diabetes mellitus with other specified complication, without long-term current use of insulin (HCC)  - Increased Glipizide from 2.5mg daily to 5mg daily  - METABOLIC PANEL, BASIC; Future  - METABOLIC PANEL, BASIC    4. Mild anxiety: JACI-7 of 9 indicating mild anxiety. Encouraged to continue working on stress relief with breath work, regular exercise. Discussed starting SSRI in the future. Concern for possible bipolar/anger issues. - REFERRAL TO PSYCHIATRY     Follow up in 2 weeks. At this visit, repeat BMP and evaluate BP/log. Patient is counseled to return to the office if symptoms do not improve as expected. Urgent consultation with the nearest Emergency Department is strongly recommended if condition worsens.     I discussed this patient with Dr. Alfredo Ash (Attending Physician)     Signed By:  Chanelle Gonzalez DO  Family Medicine Resident

## 2022-11-23 NOTE — PROGRESS NOTES
Identified Patient with two Patient identifiers (Name and ). Two Patient Identifiers confirmed. Reviewed record in preparation for visit and have obtained necessary documentation. Chief Complaint   Patient presents with    Follow Up Chronic Condition     HTN and DM follow up     Vitals:    22 1021 22 1025   BP: (!) 198/91 (!) 189/86   BP 1 Location: Right arm Right arm   BP Patient Position: Sitting Sitting   BP Cuff Size: Large adult Large adult   Pulse: 93    Temp: 98.5 °F (36.9 °C)    TempSrc: Oral    Resp: 18    Height: 5' 5\" (1.651 m)    Weight: 181 lb (82.1 kg)    SpO2: 98%         1. Have you been to the ER, urgent care clinic since your last visit? Hospitalized since your last visit? No    2. Have you seen or consulted any other health care providers outside of the [address] [place] [address] since your last visit? Include any pap smears or colon screening.  No

## 2022-11-23 NOTE — PROGRESS NOTES
I reviewed with the resident the medical history and the resident's findings on the physical examination. I discussed with the resident the patient's diagnosis and concur with the plan. Follow up hypertension. Diagnosed 1.5 months ago. Not controlled in office or at home. Asymptomatic from severe hypertension today. Hyperkalemia and inc lisinopril last visit - repeat BMP. Increase amlodipine. Add HCTZ. Follow up in 2 weeks with another BMP, needs secondary HTN work up if no response to therapy. Follow up Diabetes. Diagnosed 1.5 months ago. A1c 7.5%. On glipizide, increasing today.

## 2022-11-23 NOTE — PATIENT INSTRUCTIONS
Blood Pressure:  - Lisinopril 10mg daily   - HCTZ 25 mg daily  - Amlodipine 10mg (two of your 5mg pills)    Diabetes:  - Glipizide 5mg daily (one full pill)

## 2022-11-24 LAB
ANION GAP SERPL CALC-SCNC: 5 MMOL/L (ref 5–15)
BUN SERPL-MCNC: 47 MG/DL (ref 6–20)
BUN/CREAT SERPL: 26 (ref 12–20)
CALCIUM SERPL-MCNC: 8.9 MG/DL (ref 8.5–10.1)
CHLORIDE SERPL-SCNC: 109 MMOL/L (ref 97–108)
CO2 SERPL-SCNC: 26 MMOL/L (ref 21–32)
CREAT SERPL-MCNC: 1.81 MG/DL (ref 0.7–1.3)
GLUCOSE SERPL-MCNC: 101 MG/DL (ref 65–100)
POTASSIUM SERPL-SCNC: 4.8 MMOL/L (ref 3.5–5.1)
SODIUM SERPL-SCNC: 140 MMOL/L (ref 136–145)

## 2022-12-07 ENCOUNTER — OFFICE VISIT (OUTPATIENT)
Dept: FAMILY MEDICINE CLINIC | Age: 57
End: 2022-12-07
Payer: COMMERCIAL

## 2022-12-07 VITALS
HEIGHT: 65 IN | TEMPERATURE: 98 F | RESPIRATION RATE: 13 BRPM | HEART RATE: 90 BPM | WEIGHT: 173.4 LBS | BODY MASS INDEX: 28.89 KG/M2 | SYSTOLIC BLOOD PRESSURE: 167 MMHG | OXYGEN SATURATION: 98 % | DIASTOLIC BLOOD PRESSURE: 80 MMHG

## 2022-12-07 DIAGNOSIS — I10 HYPERTENSION, UNSPECIFIED TYPE: Primary | ICD-10-CM

## 2022-12-07 DIAGNOSIS — E78.2 MIXED HYPERLIPIDEMIA: ICD-10-CM

## 2022-12-07 DIAGNOSIS — E11.69 TYPE 2 DIABETES MELLITUS WITH OTHER SPECIFIED COMPLICATION, WITHOUT LONG-TERM CURRENT USE OF INSULIN (HCC): ICD-10-CM

## 2022-12-07 RX ORDER — ROSUVASTATIN CALCIUM 20 MG/1
20 TABLET, COATED ORAL
Qty: 90 TABLET | Refills: 1 | Status: SHIPPED | OUTPATIENT
Start: 2022-12-07

## 2022-12-07 RX ORDER — HYDROCHLOROTHIAZIDE 25 MG/1
25 TABLET ORAL DAILY
Qty: 90 TABLET | Refills: 1 | Status: SHIPPED | OUTPATIENT
Start: 2022-12-07

## 2022-12-07 RX ORDER — AMLODIPINE BESYLATE 10 MG/1
10 TABLET ORAL DAILY
Qty: 90 TABLET | Refills: 1 | Status: SHIPPED | OUTPATIENT
Start: 2022-12-07

## 2022-12-07 NOTE — PROGRESS NOTES
Daiana Giordano is a 62 y.o. male    Chief Complaint   Patient presents with    Hypertension    Diabetes       1. Have you been to the ER, urgent care clinic since your last visit? Hospitalized since your last visit? No  2. Have you seen or consulted any other health care providers outside of the [address] [place] [address] since your last visit? Include any pap smears or colon screening. No    There were no vitals taken for this visit.   3 most recent PHQ Screens 12/7/2022   Little interest or pleasure in doing things Not at all   Feeling down, depressed, irritable, or hopeless Not at all   Total Score PHQ 2 0     Health Maintenance Due   Topic Date Due    Hepatitis C Screening  Never done    Pneumococcal 0-64 years (1 - PCV) Never done    Eye Exam Retinal or Dilated  Never done    Hepatitis B Vaccine (1 of 3 - Risk 3-dose series) Never done    DTaP/Tdap/Td series (1 - Tdap) Never done    Colorectal Cancer Screening Combo  Never done    Shingrix Vaccine Age 50> (1 of 2) Never done    COVID-19 Vaccine (2 - Booster for [employer] series) 09/07/2021    Flu Vaccine (1) Never done

## 2022-12-07 NOTE — PROGRESS NOTES
Subjective   Gordy Queen is a 62 y.o. male who presents for Hypertension and Diabetes    HPI  - Patient is here for follow up of his HTN and T2DM. - He was last seen on 12/7/22 by Dr. Nakia Ziegler and his amlodipine was increased from 5 to 10mg daily. He was also started on HCTZ 25 mg daily and is also on lisinopril 40mg daily.   - He reports that he continues to take these medications and brought a BP log into the office which shows a range of 392B-135T systolic and 08Q-52C diastolic.     - He never picked up the HCTZ, says it was not sent to the pharmacy. Has not been taking it. Review of Systems   Negative except per HPI. Medical History  No past medical history on file. Medications  Current Outpatient Medications   Medication Sig    hydroCHLOROthiazide (HYDRODIURIL) 25 mg tablet Take 1 Tablet by mouth daily. rosuvastatin (CRESTOR) 20 mg tablet Take 1 Tablet by mouth nightly. Indications: high cholesterol and high triglycerides    amLODIPine (NORVASC) 10 mg tablet Take 1 Tablet by mouth daily. glipiZIDE (GLUCOTROL) 5 mg tablet Take 1 Tablet by mouth Daily (before breakfast). prednisoLONE acetate (PRED FORTE) 1 % ophthalmic suspension 1 DROP LEFT EYE FOUR TIMES A DAY 30 DAYS    ofloxacin (OCUFLOX) 0.3 % ophthalmic solution     dorzolamide-timoloL (COSOPT) 22.3-6.8 mg/mL ophthalmic solution     atropine 1 % ophthalmic solution     lisinopriL (PRINIVIL, ZESTRIL) 40 mg tablet Take 1 Tablet by mouth daily. ginkgo biloba (GINKOBA PO) Take  by mouth. TESTOSTERONE UNDECANOATE PO Take  by mouth.    vitamin E (AQUA GEMS) 268 mg (400 unit) capsule Take  by mouth daily. GINSENG PO Take  by mouth. cholecalciferol, vitamin D3, (VITAMIN D3 PO) Take  by mouth.     OTHER EU9 for focus and memory (Patient not taking: Reported on 11/23/2022)    OTHER HGH (Patient not taking: Reported on 11/23/2022)    OTHER GF9 (Patient not taking: Reported on 11/23/2022)     No current facility-administered medications for this visit. Immunizations   Immunization History   Administered Date(s) Administered    COVID-19, [employer], (age 18y+), IM, 0.5mL 07/13/2021       Allergies   No Known Allergies    Objective   Vital Signs  Visit Vitals  BP (!) 167/80 (BP 1 Location: Right arm, BP Patient Position: Sitting, BP Cuff Size: Large adult)   Pulse 90   Temp 98 °F (36.7 °C)   Resp 13   Ht 5' 5\" (1.651 m)   Wt 173 lb 6.4 oz (78.7 kg)   SpO2 98%   BMI 28.86 kg/m²       Physical Examination  Physical Exam  HENT:      Mouth/Throat:      Mouth: Mucous membranes are moist.      Pharynx: Oropharynx is clear. Cardiovascular:      Rate and Rhythm: Normal rate and regular rhythm. Pulses: Normal pulses. Heart sounds: Normal heart sounds. Pulmonary:      Effort: Pulmonary effort is normal.      Breath sounds: Normal breath sounds. Skin:     General: Skin is warm and dry. Capillary Refill: Capillary refill takes less than 2 seconds. Neurological:      Mental Status: He is alert and oriented to person, place, and time. Assessment and Plan   Trever Morley is a 62 y.o. male who presents for Hypertension and Diabetes      1. Hypertension, unspecified type. Still poorly controlled. Had not started HCTZ medication.  - BP initiailly 167/80, decreased to 145/70 on re-check. - Resent HCTZ to pharmacy. Also sent 90-day supply of amlodipine 10mg. - hydroCHLOROthiazide (HYDRODIURIL) 25 mg tablet; Take 1 Tablet by mouth daily. Dispense: 90 Tablet; Refill: 1  - amLODIPine (NORVASC) 10 mg tablet; Take 1 Tablet by mouth daily. Dispense: 90 Tablet; Refill: 1     2. Type 2 Diabetes Mellitus. Previous A1c 7.5, poorly controlled. - continue glipizide. Has refills at the pharmacy.   - RTC in 2 months for follow up visit. 3. Mixed Hyperlipidemia. Refilled crestor. Will recheck lipids at follow up visit. - rosuvastatin (CRESTOR) 20 mg tablet; Take 1 Tablet by mouth nightly.  Indications: high cholesterol and high triglycerides Dispense: 90 Tablet; Refill: 1    - Patient will return to clinic in 2 months for HTN, T2DM follow up. Will obtain BMP, A1c at that visit. Return in about 1 month (around 1/7/2023). Pt was discussed with Dr. Jake Sanchez MD (attending physician). I have reviewed patient medical and social history and medications. I have reviewed pertinent labs results and other data. I have discussed the diagnosis with the patient and the intended plan as seen in the above orders. The patient has received an after-visit summary and questions were answered concerning future plans. I have discussed medication side effects and warnings with the patient as well.     German Leal MD  PGY1 Resident, [facility]  12/07/22

## 2023-03-10 ENCOUNTER — OFFICE VISIT (OUTPATIENT)
Dept: FAMILY MEDICINE CLINIC | Age: 58
End: 2023-03-10

## 2023-03-10 VITALS
SYSTOLIC BLOOD PRESSURE: 148 MMHG | BODY MASS INDEX: 29.09 KG/M2 | DIASTOLIC BLOOD PRESSURE: 77 MMHG | OXYGEN SATURATION: 96 % | HEART RATE: 95 BPM | WEIGHT: 174.8 LBS | TEMPERATURE: 98.2 F

## 2023-03-10 DIAGNOSIS — N18.30 STAGE 3 CHRONIC KIDNEY DISEASE, UNSPECIFIED WHETHER STAGE 3A OR 3B CKD (HCC): ICD-10-CM

## 2023-03-10 DIAGNOSIS — E11.3533: ICD-10-CM

## 2023-03-10 DIAGNOSIS — Z23 ENCOUNTER FOR IMMUNIZATION: ICD-10-CM

## 2023-03-10 DIAGNOSIS — I10 HYPERTENSION, UNSPECIFIED TYPE: ICD-10-CM

## 2023-03-10 DIAGNOSIS — H25.9 AGE-RELATED CATARACT OF BOTH EYES, UNSPECIFIED AGE-RELATED CATARACT TYPE: Primary | ICD-10-CM

## 2023-03-10 DIAGNOSIS — Z01.818 PREOP EXAMINATION: ICD-10-CM

## 2023-03-10 DIAGNOSIS — R00.0 TACHYCARDIA: ICD-10-CM

## 2023-03-10 PROBLEM — N18.9 CHRONIC KIDNEY DISEASE: Status: ACTIVE | Noted: 2022-10-27

## 2023-03-10 RX ORDER — CHLORTHALIDONE 25 MG/1
25 TABLET ORAL DAILY
Qty: 30 TABLET | Refills: 0 | Status: SHIPPED | OUTPATIENT
Start: 2023-03-10

## 2023-03-10 NOTE — ASSESSMENT & PLAN NOTE
Chronic, uncontrolled   - Start Chlorthalidone 25mg daily (current Crcl 40)  - Will need electrolytes rechecked in 2-4 weeks  - Counseling continuing lifestyle modifications (exercise, low salt diet)  - Follow up 2 weeks

## 2023-03-10 NOTE — PROGRESS NOTES
[address] [facility] [address] [place] [address]   Office [phone]  Fax [phone]       Preoperative Evaluation    Date of Exam: 3/10/2023    Trinh Loja is a 62 y.o. male (:1965) who presents for preoperative evaluation. Procedure/Surgery: Vitrectomy, removal of cataract, external IOL placement    Date of Procedure/Surgery: 3/20/23    Surgeon: Dr. Les Cooley: [facility]    Primary Physician: Gray Fothergill, DO    Latex Allergy: no    Recent use of: No recent use of aspirin (ASA), NSAIDS or steroids    Tetanus up to date: tetanus status unknown to the patient    Anesthesia Complications: None    History of abnormal bleeding : None    History of Blood Transfusions: no    Health Care Directive or Living Will: no    Functional Capacity: able to climb a flight of stairs without chest pain or severe SOB    HTN:  Reviewed home BP lo-150/90  Meds: Norvasc 10. Lisinopril 40. Has not started HCTZ  ROS: Denies chest pain on exertion, FLETCHER, lower extremity swelling, palpitations, cough, orthostatic dizziness/lightheadedness  Diet: cut out salt  Exercise: does weight lifting and cardio daily  Tobacco use: No  Alcohol use: social  NSAID use: No          Allergies- reviewed:   No Known Allergies      Medications- reviewed:   Current Outpatient Medications   Medication Sig    chlorthalidone (HYGROTON) 25 mg tablet Take 1 Tablet by mouth daily. rosuvastatin (CRESTOR) 20 mg tablet Take 1 Tablet by mouth nightly. Indications: high cholesterol and high triglycerides    amLODIPine (NORVASC) 10 mg tablet Take 1 Tablet by mouth daily. glipiZIDE (GLUCOTROL) 5 mg tablet Take 1 Tablet by mouth Daily (before breakfast). lisinopriL (PRINIVIL, ZESTRIL) 40 mg tablet Take 1 Tablet by mouth daily. No current facility-administered medications for this visit. Past Medical History- reviewed:  History reviewed. No pertinent past medical history.       Past Surgical History- reviewed:   Alcohol use: social  History reviewed. No pertinent surgical history. Social History- reviewed:  Social History     Socioeconomic History    Marital status: UNKNOWN     Spouse name: Not on file    Number of children: Not on file    Years of education: Not on file    Highest education level: Not on file   Occupational History    Not on file   Tobacco Use    Smoking status: Never    Smokeless tobacco: Never   Substance and Sexual Activity    Alcohol use: Yes    Drug use: Never    Sexual activity: Not on file   Other Topics Concern    Not on file   Social History Narrative    Not on file     Social Determinants of Health     Financial Resource Strain: Not on file   Food Insecurity: Not on file   Transportation Needs: Not on file   Physical Activity: Not on file   Stress: Not on file   Social Connections: Not on file   Intimate Partner Violence: Not on file   Housing Stability: Not on file         Immunizations- reviewed:   Immunization History   Administered Date(s) Administered    COVID-19, J&J, (age 18y+), IM, 0.5mL 07/13/2021    Tdap 03/10/2023           REVIEW OF SYSTEMS:  Review of Systems -   General ROS: negative for - chills or fever  Respiratory ROS: negative for - cough, shortness of breath or wheezing  Cardiovascular ROS: negative for - chest pain or palpitations  Gastrointestinal ROS: negative for - abdominal pain, constipation, diarrhea, nausea, vomiting  Neurological ROS: negative for - dizziness or headaches  Dermatological ROS: negative for - rash or skin lesion changes        EXAM:   Visit Vitals  BP (!) 148/77   Pulse 95   Temp 98.2 °F (36.8 °C) (Oral)   Wt 174 lb 12.8 oz (79.3 kg)   SpO2 96%   BMI 29.09 kg/m²       General: Patient alert and oriented and in NAD  HEENT: PER/EOMI, no conjunctival pallor or scleral icterus. Heart: Regular rate and rhythm, No murmurs, rubs or gallops.    Lungs: Clear to auscultation bilaterally, no wheezing, rales or rhonchi  Abd: +BS, non-tender, non-distended  Ext: No edema  Skin: No rashes or lesions noted on exposed skin  Neuro: AAOx3  Psych: Appropriate mood and affect        DIAGNOSTICS:   1. EKG: NSR with non voltage LVH  2. Labs: ordered repeat kidney function  Lab Results   Component Value Date/Time    Sodium 140 11/23/2022 11:50 AM    Potassium 4.8 11/23/2022 11:50 AM    Chloride 109 (H) 11/23/2022 11:50 AM    CO2 26 11/23/2022 11:50 AM    Anion gap 5 11/23/2022 11:50 AM    Glucose 101 (H) 11/23/2022 11:50 AM    BUN 47 (H) 11/23/2022 11:50 AM    Creatinine 1.81 (H) 11/23/2022 11:50 AM    BUN/Creatinine ratio 26 (H) 11/23/2022 11:50 AM    eGFR 43 (L) 11/23/2022 11:50 AM    Calcium 8.9 11/23/2022 11:50 AM           IMPRESSION:     Pt presents for preoperative evaluation for eye surgery and is  low risk for this low risk surgery based on previous lab work - Revised Cardiac Risk Index score is 3.9% for risk of major cardiac event. Chronic conditions that may impact the pre-op, intra-op, and post-op courses include: uncontrolled HTN, diabetes. Assessment and Plan:  Diagnoses and all orders for this visit:    1. Age-related cataract of both eyes, unspecified age-related cataract type    2. 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 Encounter for immunization  -     TDAP, BOOSTRIX, (AGE 10 YRS+), IM  -     IA IMMUNIZ ADMIN,1 SINGLE/COMB VAC/TOXOID    4. Preop examination    5.  Hypertension, unspecified type  Assessment & Plan:  Chronic, uncontrolled   - Start Chlorthalidone 25mg daily (current Crcl 40)  - Will need electrolytes rechecked in 2-4 weeks  - Counseling continuing lifestyle modifications (exercise, low salt diet)  - Follow up 2 weeks      Orders:  -     chlorthalidone (HYGROTON) 25 mg tablet; Take 1 Tablet by mouth daily.  -     RENAL FUNCTION PANEL; Future    6. Tachycardia  -     AMB POC EKG ROUTINE W/ 12 LEADS, INTER & REP    7. Stage 3 chronic kidney disease, unspecified whether stage 3a or 3b CKD (HCC)  Assessment & Plan:  Chronic. Macro albuminuria  - Would likely benefit from SGLT2i - discuss at next visit  - Recheck kidney function panel  - If kidney function still CKD3B will need further work up and referral to nephrology      Preop forms will be faxed once renal panel resulted      Follow-up and Dispositions    Return in about 2 weeks (around 3/24/2023) for Blood pressure recheck. Then follow up for annual physical .         I have discussed the aforementioned diagnoses and plan with the patient in detail. I have provided information in person and/or in AVS. All questions answered prior to discharge. I discussed this patient with Dr. Jacob Trejo (Attending Physician).     Signed By:  Suzette Shaikh MD     Family Medicine Resident

## 2023-03-10 NOTE — ASSESSMENT & PLAN NOTE
Chronic.  Macro albuminuria  - Would likely benefit from SGLT2i - discuss at next visit  - Recheck kidney function panel  - If kidney function still CKD3B will need further work up and referral to nephrology

## 2023-03-11 LAB
ALBUMIN SERPL-MCNC: 3.5 G/DL (ref 3.5–5)
ANION GAP SERPL CALC-SCNC: 5 MMOL/L (ref 5–15)
BUN SERPL-MCNC: 25 MG/DL (ref 6–20)
BUN/CREAT SERPL: 13 (ref 12–20)
CALCIUM SERPL-MCNC: 9 MG/DL (ref 8.5–10.1)
CHLORIDE SERPL-SCNC: 110 MMOL/L (ref 97–108)
CO2 SERPL-SCNC: 28 MMOL/L (ref 21–32)
CREAT SERPL-MCNC: 1.89 MG/DL (ref 0.7–1.3)
EST. AVERAGE GLUCOSE BLD GHB EST-MCNC: 128 MG/DL
GLUCOSE SERPL-MCNC: 219 MG/DL (ref 65–100)
HBA1C MFR BLD: 6.1 % (ref 4–5.6)
PHOSPHATE SERPL-MCNC: 2.8 MG/DL (ref 2.6–4.7)
POTASSIUM SERPL-SCNC: 4.1 MMOL/L (ref 3.5–5.1)
SODIUM SERPL-SCNC: 143 MMOL/L (ref 136–145)

## 2023-03-14 PROBLEM — N18.32 STAGE 3B CHRONIC KIDNEY DISEASE (HCC): Status: ACTIVE | Noted: 2022-10-27

## 2023-06-13 RX ORDER — CHLORTHALIDONE 25 MG/1
25 TABLET ORAL DAILY
Qty: 30 TABLET | Refills: 1 | Status: SHIPPED | OUTPATIENT
Start: 2023-06-13

## 2023-12-21 NOTE — PATIENT INSTRUCTIONS
Please start taking amlodipine 5mg daily    Please take 1/2 glipizide tablet daily each morning for your diabetes. STOP taking your metformin. We will see you back in 2 weeks. Here is my diet and exercise blurb:  Please focus on eating a healthy diet and exercising regularly. This can help you lose weight, lower your sugar and cholesterol levels, and overall make you healthier to decrease your change of developing disease and early death. Diet:  - Drink less sugary beverages and alcohol (soda, sweet tea, beer, wine, hard seltzer, liquor)  - Drink more water (100 ounces per day)  - Eat less simple carbohydrates/sugar (sweets, white bread, baked goods)  - Eat more complex carbohydrates (whole grain bread, brown rice)  - Eat less processed foods (snacks, chips, etc.)  - Eat more whole foods (vegetables, grass-fed or free-range animal products)  - Good guidelines to follow are the \"Mediterranean Diet\"  - Snack less in-between meals. This gives your body a chance to use more body fat for fuel. Exercise:  - Try to do some form of activity at least 5 times per week  - A little bit is better than nothing.  Start with 10 minutes per day  - Some examples of exercises to incorporate into your daily schedule:   - Walk 0.5-1 mile outdoors (10-25 minutes)   - The \"Kitchen Workout\" (5-10 minutes)  - 10 bodyweight squats  - 10 calf raises (go up on tippy toes)  - 10 Push-Offs (push-up while standing w/ hands on corner of counter top)  - 10 Lunges Right Leg Forward  - 10 Lunges Left Leg Forward Quality 226: Preventive Care And Screening: Tobacco Use: Screening And Cessation Intervention: Patient screened for tobacco use and is an ex/non-smoker Quality 130: Documentation Of Current Medications In The Medical Record: Current Medications Documented Detail Level: Detailed Quality 431: Preventive Care And Screening: Unhealthy Alcohol Use - Screening: Patient not identified as an unhealthy alcohol user when screened for unhealthy alcohol use using a systematic screening method Quality 402: Tobacco Use And Help With Quitting Among Adolescents: Patient screened for tobacco and never smoked

## 2024-07-09 ENCOUNTER — OFFICE VISIT (OUTPATIENT)
Age: 59
End: 2024-07-09
Payer: MEDICAID

## 2024-07-09 VITALS
SYSTOLIC BLOOD PRESSURE: 170 MMHG | OXYGEN SATURATION: 96 % | RESPIRATION RATE: 18 BRPM | HEART RATE: 68 BPM | HEIGHT: 65 IN | WEIGHT: 172 LBS | DIASTOLIC BLOOD PRESSURE: 87 MMHG | TEMPERATURE: 98.4 F | BODY MASS INDEX: 28.66 KG/M2

## 2024-07-09 DIAGNOSIS — E11.3533: ICD-10-CM

## 2024-07-09 DIAGNOSIS — Z13.31 POSITIVE SCREENING FOR DEPRESSION ON 9-ITEM PATIENT HEALTH QUESTIONNAIRE (PHQ-9): ICD-10-CM

## 2024-07-09 DIAGNOSIS — I10 ESSENTIAL (PRIMARY) HYPERTENSION: Primary | ICD-10-CM

## 2024-07-09 DIAGNOSIS — N18.32 STAGE 3B CHRONIC KIDNEY DISEASE (HCC): ICD-10-CM

## 2024-07-09 PROCEDURE — 99214 OFFICE O/P EST MOD 30 MIN: CPT

## 2024-07-09 RX ORDER — INSULIN GLARGINE 100 [IU]/ML
6 INJECTION, SOLUTION SUBCUTANEOUS NIGHTLY
Qty: 5 ADJUSTABLE DOSE PRE-FILLED PEN SYRINGE | Refills: 1 | Status: SHIPPED | OUTPATIENT
Start: 2024-07-09

## 2024-07-09 RX ORDER — ATORVASTATIN CALCIUM 80 MG/1
80 TABLET, FILM COATED ORAL DAILY
COMMUNITY
Start: 2024-06-18

## 2024-07-09 RX ORDER — CARVEDILOL 6.25 MG/1
6.25 TABLET ORAL 2 TIMES DAILY
COMMUNITY
Start: 2024-06-18 | End: 2024-07-09

## 2024-07-09 RX ORDER — CLOPIDOGREL BISULFATE 75 MG/1
75 TABLET ORAL DAILY
COMMUNITY
Start: 2024-06-18

## 2024-07-09 RX ORDER — CARVEDILOL 12.5 MG/1
12.5 TABLET ORAL 2 TIMES DAILY
Qty: 180 TABLET | Refills: 0 | Status: SHIPPED | OUTPATIENT
Start: 2024-07-09

## 2024-07-09 RX ORDER — CLONIDINE HYDROCHLORIDE 0.2 MG/1
0.2 TABLET ORAL 2 TIMES DAILY
COMMUNITY
Start: 2024-06-18

## 2024-07-09 RX ORDER — PEN NEEDLE, DIABETIC 31 G X1/4"
1 NEEDLE, DISPOSABLE MISCELLANEOUS DAILY
Qty: 100 EACH | Refills: 3 | Status: SHIPPED | OUTPATIENT
Start: 2024-07-09

## 2024-07-09 RX ORDER — ASPIRIN 81 MG/1
81 TABLET, COATED ORAL DAILY
COMMUNITY
Start: 2024-06-18

## 2024-07-09 RX ORDER — DAPAGLIFLOZIN 10 MG/1
10 TABLET, FILM COATED ORAL EVERY MORNING
COMMUNITY

## 2024-07-09 ASSESSMENT — PATIENT HEALTH QUESTIONNAIRE - PHQ9
4. FEELING TIRED OR HAVING LITTLE ENERGY: MORE THAN HALF THE DAYS
SUM OF ALL RESPONSES TO PHQ QUESTIONS 1-9: 16
9. THOUGHTS THAT YOU WOULD BE BETTER OFF DEAD, OR OF HURTING YOURSELF: NOT AT ALL
10. IF YOU CHECKED OFF ANY PROBLEMS, HOW DIFFICULT HAVE THESE PROBLEMS MADE IT FOR YOU TO DO YOUR WORK, TAKE CARE OF THINGS AT HOME, OR GET ALONG WITH OTHER PEOPLE: EXTREMELY DIFFICULT
3. TROUBLE FALLING OR STAYING ASLEEP: NEARLY EVERY DAY
SUM OF ALL RESPONSES TO PHQ QUESTIONS 1-9: 16
7. TROUBLE CONCENTRATING ON THINGS, SUCH AS READING THE NEWSPAPER OR WATCHING TELEVISION: NOT AT ALL
6. FEELING BAD ABOUT YOURSELF - OR THAT YOU ARE A FAILURE OR HAVE LET YOURSELF OR YOUR FAMILY DOWN: MORE THAN HALF THE DAYS
SUM OF ALL RESPONSES TO PHQ9 QUESTIONS 1 & 2: 6
5. POOR APPETITE OR OVEREATING: NOT AT ALL
2. FEELING DOWN, DEPRESSED OR HOPELESS: NEARLY EVERY DAY
1. LITTLE INTEREST OR PLEASURE IN DOING THINGS: NEARLY EVERY DAY
8. MOVING OR SPEAKING SO SLOWLY THAT OTHER PEOPLE COULD HAVE NOTICED. OR THE OPPOSITE, BEING SO FIGETY OR RESTLESS THAT YOU HAVE BEEN MOVING AROUND A LOT MORE THAN USUAL: NEARLY EVERY DAY

## 2024-07-09 ASSESSMENT — ANXIETY QUESTIONNAIRES
3. WORRYING TOO MUCH ABOUT DIFFERENT THINGS: MORE THAN HALF THE DAYS
1. FEELING NERVOUS, ANXIOUS, OR ON EDGE: MORE THAN HALF THE DAYS
5. BEING SO RESTLESS THAT IT IS HARD TO SIT STILL: NOT AT ALL
2. NOT BEING ABLE TO STOP OR CONTROL WORRYING: MORE THAN HALF THE DAYS
7. FEELING AFRAID AS IF SOMETHING AWFUL MIGHT HAPPEN: MORE THAN HALF THE DAYS
6. BECOMING EASILY ANNOYED OR IRRITABLE: NEARLY EVERY DAY
4. TROUBLE RELAXING: NOT AT ALL
GAD7 TOTAL SCORE: 11
IF YOU CHECKED OFF ANY PROBLEMS ON THIS QUESTIONNAIRE, HOW DIFFICULT HAVE THESE PROBLEMS MADE IT FOR YOU TO DO YOUR WORK, TAKE CARE OF THINGS AT HOME, OR GET ALONG WITH OTHER PEOPLE: EXTREMELY DIFFICULT

## 2024-07-10 ENCOUNTER — TELEPHONE (OUTPATIENT)
Age: 59
End: 2024-07-10

## 2024-07-10 LAB
ANION GAP SERPL CALC-SCNC: 4 MMOL/L (ref 5–15)
BUN SERPL-MCNC: 36 MG/DL (ref 6–20)
BUN/CREAT SERPL: 18 (ref 12–20)
CALCIUM SERPL-MCNC: 9 MG/DL (ref 8.5–10.1)
CHLORIDE SERPL-SCNC: 101 MMOL/L (ref 97–108)
CHOLEST SERPL-MCNC: 98 MG/DL
CO2 SERPL-SCNC: 29 MMOL/L (ref 21–32)
CREAT SERPL-MCNC: 2.02 MG/DL (ref 0.7–1.3)
CREAT UR-MCNC: 96.5 MG/DL
GLUCOSE SERPL-MCNC: 484 MG/DL (ref 65–100)
HDLC SERPL-MCNC: 29 MG/DL
HDLC SERPL: 3.4 (ref 0–5)
LDLC SERPL CALC-MCNC: 47.4 MG/DL (ref 0–100)
MICROALBUMIN UR-MCNC: 225 MG/DL
MICROALBUMIN/CREAT UR-RTO: 2332 MG/G (ref 0–30)
POTASSIUM SERPL-SCNC: 4.7 MMOL/L (ref 3.5–5.1)
SODIUM SERPL-SCNC: 134 MMOL/L (ref 136–145)
TRIGL SERPL-MCNC: 108 MG/DL
VLDLC SERPL CALC-MCNC: 21.6 MG/DL

## 2024-07-10 NOTE — TELEPHONE ENCOUNTER
Unable to process the PA due to  conflict. Patients  is different then what is on his insurance card. [employer] message sent to patient after I called patient and was unable to leave a voicemail.

## 2024-07-12 NOTE — TELEPHONE ENCOUNTER
I spoke with Sahara from [facility] who stated that since Dr. Angelo is not enrolled in the medicaid system he is unable to send in prescription for the medicaid patients. She stated that because of the doctor not being enrolled it may have triggered the need for a prior authorization. She also stated that if a different doctor who is enrolled in the medicaid system send over the prescription then it may or may not eliminate the need for a prior authorization.     I did inform her that Liz tried to do a prior auth but was getting pushed back due to the patient date of birth. Sahara stated that medicaid and the pharmacy knows about this issue and it has been an ongoing process. She stated to \"Change the birthday to 1965 if its not going through.\"    She did give me a number for you to call if there is any issues. [phone] she stated to follow the prompts until you to the prior authorization department.

## 2024-07-12 NOTE — PROGRESS NOTES
[facility] Residency Attending Attestation: I have seen and evaluated the patient, repeating/performing the critical or key elements of the service. I discussed the findings, assessment and plan with the resident and agree with the resident's documentation.    Mikey Claire MD, MPH  [facility]    
Chief Complaint   Patient presents with    Follow-up Chronic Condition     Vitals:    07/09/24 1044 07/09/24 1113   BP: (!) 172/85 (!) 170/87   Site: Right Upper Arm Right Upper Arm   Position: Sitting Sitting   Cuff Size: Large Adult Medium Adult   Pulse: 68 68   Resp: 18    Temp: 98.4 °F (36.9 °C)    TempSrc: Oral    SpO2: 96%    Weight: 78 kg (172 lb)    Height: 1.651 m (5' 5\")      \"Have you been to the ER, urgent care clinic since your last visit?  Hospitalized since your last visit?\"    6/14/2024    “Have you seen or consulted any other health care providers outside of [facility] since your last visit?”    NO      “Have you had a colorectal cancer screening such as a colonoscopy/FIT/Cologuard?    NO    No colonoscopy on file  No cologuard on file  No FIT/FOBT on file   No flexible sigmoidoscopy on file         Click Here for Release of Records Request  
Normal heart sounds.   Pulmonary:      Effort: Pulmonary effort is normal.      Breath sounds: Normal breath sounds.   Abdominal:      General: Abdomen is flat.      Palpations: Abdomen is soft.   Neurological:      General: No focal deficit present.      Mental Status: He is alert.      Cranial Nerves: No cranial nerve deficit.      Sensory: No sensory deficit.      Motor: No weakness.      Coordination: Coordination normal.      Gait: Gait normal.      Comments: Notable for some difficulty with speech, Intermittent slurring. Reportedly improving from hospitalization        Assessment and Plan   Jaison Ray is a 59 y.o. who is here for hospital follow up after CVA 6/14-6/18. Was off of all medications for at least a year prior to this event. Now back on anti-hypertensives, TIIDM medication, ASA/plavix. Patient has follow up with cards and neurology. My primary concerns today are glucose and blood pressure control. Patient is hypertensive in clinic today as well as at home, also with glucose levels ranging between 200-400's with increased urination of recent.     TIIDM: Continue Farxiga 10mg daily, Glipizide 5mg daily. Add semaglutide 3mg daily. Add 6u lantus nightly. Follow up 1-2 weeks. Bring glucose log. I am hoping to not have patient on insulin for long periods of time, however, I am concerned about his significant hyperglycemia at home. Cannot start metformin at this time due to poor kidney function.     HTN: Continue amlodipine 10mg, Clinidine 0.2BID, Lisinopril 40mg daily. Increase coreg from 6.25mg to 12.5mg BID    CKD3b: Obtain BMP    Positive PHQ-9: No SI. Discussed establishing with therapist. Will prioritize the above at this time and follow up closely at next visit.    Diagnoses and Associated Orders:  1. Essential (primary) hypertension  -     Basic Metabolic Panel; Future  -     Lipid Panel; Future  -     Microalbumin / Creatinine Urine Ratio; Future  -     carvedilol (COREG) 12.5 MG tablet; Take 1

## 2024-07-23 ENCOUNTER — OFFICE VISIT (OUTPATIENT)
Age: 59
End: 2024-07-23
Payer: COMMERCIAL

## 2024-07-23 VITALS
DIASTOLIC BLOOD PRESSURE: 74 MMHG | RESPIRATION RATE: 18 BRPM | HEIGHT: 65 IN | HEART RATE: 64 BPM | WEIGHT: 171.4 LBS | SYSTOLIC BLOOD PRESSURE: 151 MMHG | BODY MASS INDEX: 28.56 KG/M2 | OXYGEN SATURATION: 97 % | TEMPERATURE: 98.2 F

## 2024-07-23 DIAGNOSIS — E11.65 HYPERGLYCEMIA DUE TO DIABETES MELLITUS (HCC): ICD-10-CM

## 2024-07-23 DIAGNOSIS — I10 ESSENTIAL (PRIMARY) HYPERTENSION: ICD-10-CM

## 2024-07-23 DIAGNOSIS — E11.3533: Primary | ICD-10-CM

## 2024-07-23 DIAGNOSIS — R06.83 SNORING: ICD-10-CM

## 2024-07-23 PROCEDURE — 99214 OFFICE O/P EST MOD 30 MIN: CPT

## 2024-07-23 PROCEDURE — 3078F DIAST BP <80 MM HG: CPT

## 2024-07-23 PROCEDURE — 3077F SYST BP >= 140 MM HG: CPT

## 2024-07-23 RX ORDER — CARVEDILOL 25 MG/1
25 TABLET ORAL 2 TIMES DAILY
Qty: 60 TABLET | Refills: 3 | Status: SHIPPED | OUTPATIENT
Start: 2024-07-23

## 2024-07-23 RX ORDER — BLOOD-GLUCOSE METER
1 EACH MISCELLANEOUS DAILY
COMMUNITY
Start: 2024-06-18

## 2024-07-23 RX ORDER — LANCETS 33 GAUGE
1 EACH MISCELLANEOUS DAILY
COMMUNITY
Start: 2024-06-18

## 2024-07-23 RX ORDER — ONDANSETRON 4 MG/1
4 TABLET, FILM COATED ORAL EVERY 8 HOURS PRN
COMMUNITY
Start: 2024-07-12

## 2024-07-23 NOTE — PATIENT INSTRUCTIONS
Med changes:  - Take insulin 14 units daily (increase from 6 units)  - Take semaglutide 7mg daily (increase from 3mg)  - Take 25mg coreg twice daily (increase from 12.5mg)     [facility] Transportation Resources*  (Call [employer] Way/[facility] if need more resources.)      [facility] Transit System  What they offer: Provides public transportation to the [facility] and parts of [place] and [facility].  Website: http://www.ridegrtc.com/  Phone Number: 345.813.3255  [facility] Specialized Services (CARE & CARE Plus)  What they offer: Provides public transportation access to individuals with disabilities who may not reasonably be able to use [facility] fixed route bus service. Provides gehkji-jd-xkhnigbyhwi services under the guidelines of the ADA.  Website: http://Innometrics/services/specialized-transportation/care  Phone Number: 893.786.3789  [employer] Ride Connection  What they offer: Ride Connection provides 2 round trip rides/month to non-emergency medical appointments (must qualify)  Website:  https://seniorconnections-va.org/services/support-to-stay-home/ride-connections/  Phone Number: 940.191.9934  Eligibility Requirements: Individuals with disabilities (18+) or older adults (60+) and live in the [facility] or the [facility] of [facility] and [place].  Sliding scale fee system.  You must give 7 days notice to schedules rides, which are subject to availability.      [employer] Services  What they offer: Donation based transportation services for veterans, families in need, the elderly, and those with disabilities.  Website: https://www.[place].[employer]/  Phone Number: 573.922.3978.  Please allow two weeks notice in scheduling rides, which are subject to availability.    Additional Information: Offers transport to appointments, grocery store, airport, etc. in the areas of [facility], [facility], and [place].      [facility]

## 2024-07-24 NOTE — PROGRESS NOTES
I saw and evaluated the patient, performing the key elements of the service on the date of service.  I discussed the findings, assessment and plan with the resident and agree with the resident's findings and plan as documented in the resident's note.     Edna Dennis MD 7/24/2024   
Identified pt with two pt identifiers(name and ). Reviewed record in preparation for visit and have obtained necessary documentation.  Chief Complaint   Patient presents with    Hypertension    Diabetes   Last A1C was 6.1 on 03/10/23.     Health Maintenance Due   Topic    Hepatitis B vaccine (1 of 3 - 3-dose series)    Pneumococcal 0-64 years Vaccine (1 of 2 - PCV)    HIV screen     Diabetic retinal exam     Hepatitis C screen     Colorectal Cancer Screen     Shingles vaccine (1 of 2)    COVID-19 Vaccine (2 -  season)    Diabetic foot exam     A1C test (Diabetic or Prediabetic)        Vitals:    24 1015 24 1042   BP: (!) 156/77 (!) 151/74   Site: Right Upper Arm Left Upper Arm   Position: Sitting Sitting   Cuff Size: Medium Adult Medium Adult   Pulse: 64 64   Resp: 18    Temp: 98.2 °F (36.8 °C)    TempSrc: Oral    SpO2: 97%    Weight: 77.7 kg (171 lb 6.4 oz)    Height: 1.651 m (5' 5\")          \"Have you been to the ER, urgent care clinic since your last visit?  Hospitalized since your last visit?\"    Yes, Meche Franz, 24- mini stroke    “Have you seen or consulted any other health care providers outside of [facility] since your last visit?”    Yes, Neurology, Cardiology      “Have you had a colorectal cancer screening such as a colonoscopy/FIT/Cologuard?    NO    No colonoscopy on file  No cologuard on file  No FIT/FOBT on file   No flexible sigmoidoscopy on file         Click Here for Release of Records Request     This patient is accompanied in the office by his spouse.  I have received verbal consent from Jaison Ray to discuss any/all medical information while they are present in the room.  
glucose checks ACHS    2. Hyperglycemia due to diabetes mellitus (HCC)    3. Essential (primary) hypertension   - Improved but poorly controlled   - Increase coreg from 12.5mg BID to 25mg BID  -     carvedilol (COREG) 25 MG tablet; Take 1 tablet by mouth 2 times daily, Disp-60 tablet, R-3Normal    4. Snoring  -     [facility] - Miriam Mckee MD, Sleep Medicine, [place]     Urgent consultation with the nearest Emergency Department is strongly recommended if condition worsens.    I discussed this patient with Dr. Dennis (Attending Physician)     Signed By:  Tato Angelo DO  Family Medicine Resident

## 2024-07-25 ASSESSMENT — SLEEP AND FATIGUE QUESTIONNAIRES
DO YOU HAVE DIFFICULTY VISITING YOUR FAMILY OR FRIENDS IN THEIR HOME BECAUSE YOU BECOME SLEEPY OR TIRED: YES, A LITTLE
HOW LIKELY ARE YOU TO NOD OFF OR FALL ASLEEP WHILE WATCHING TV: HIGH CHANCE OF DOZING
HOW LIKELY ARE YOU TO NOD OFF OR FALL ASLEEP WHILE SITTING AND TALKING TO SOMEONE: SLIGHT CHANCE OF DOZING
DO YOU HAVE DIFFICULTY OPERATING A MOTOR VEHICLE FOR SHORT DISTANCES (LESS THAN 100 MILES) BECAUSE YOU BECOME SLEEPY: NO
DO YOU GENERALLY HAVE DIFFICULTY REMEMBERING THINGS BECAUSE YOU ARE SLEEPY OR TIRED: YES, A LITTLE
HOW LIKELY ARE YOU TO NOD OFF OR FALL ASLEEP WHILE SITTING INACTIVE IN A PUBLIC PLACE: SLIGHT CHANCE OF DOZING
HOW LIKELY ARE YOU TO NOD OFF OR FALL ASLEEP IN A CAR, WHILE STOPPED FOR A FEW MINUTES IN TRAFFIC: MODERATE CHANCE OF DOZING
HOW LIKELY ARE YOU TO NOD OFF OR FALL ASLEEP WHILE SITTING AND READING: HIGH CHANCE OF DOZING
HOW LIKELY ARE YOU TO NOD OFF OR FALL ASLEEP WHILE SITTING QUIETLY AFTER LUNCH WITHOUT ALCOHOL: MODERATE CHANCE OF DOZING
FOSQ SCORE: 17
DO YOU HAVE DIFFICULTY BEING AS ACTIVE AS YOU WANT TO BE IN THE EVENING BECAUSE YOU ARE SLEEPY OR TIRED: YES, LITTLE
SELECT ANY OF THE FOLLOWING BEHAVIORS OBSERVED WHILE YOU ARE ASLEEP: LOUD SNORING
DO YOU GET TOO LITTLE SLEEP AT NIGHT: YES
SELECT ANY OF THE FOLLOWING BEHAVIORS OBSERVED WHILE PATIENT ASLEEP: LOUD SNORING
HOW MANY NAPS DO YOU TAKE PER WEEK: 7
NUMBER OF TIMES YOU WAKE PER NIGHT: 4
DO YOU HAVE DIFFICULTY CONCENTRATING ON THE THINGS YOU DO BECAUSE YOU ARE SLEEPY OR TIRED: YES, A LITTLE
ARE YOU BOTHERED BY WAKING UP TOO EARLY AND NOT BEING ABLE TO GET BACK TO SLEEP: NO
HOW LIKELY ARE YOU TO NOD OFF OR FALL ASLEEP WHEN YOU ARE A PASSENGER IN A CAR FOR AN HOUR WITHOUT A BREAK: HIGH CHANCE OF DOZING
ARE YOU BOTHERED BY WAKING UP TOO EARLY AND NOT BEING ABLE TO GET BACK TO SLEEP: NO
WHAT TIME DO YOU USUALLY WAKE UP: 10:00
ESS TOTAL SCORE: 18
DO YOU HAVE DIFFICULTY OPERATING A MOTOR VEHICLE FOR LONG DISTANCES (GREATER THAN 100 MILES) BECAUSE YOU BECOME SLEEPY: NO
HOW LIKELY ARE YOU TO NOD OFF OR FALL ASLEEP WHILE SITTING INACTIVE IN A PUBLIC PLACE: SLIGHT CHANCE OF DOZING
DO YOU HAVE DIFFICULTY BEING AS ACTIVE AS YOU WANT TO BE IN THE MORNING BECAUSE YOU ARE SLEEPY OR TIRED: YES, LITTLE
HAS YOUR MOOD BEEN AFFECTED BECAUSE YOU ARE SLEEPY OR TIRED: NO
DO YOU HAVE DIFFICULTY WATCHING A MOVIE OR VIDEO BECAUSE YOU BECOME SLEEPY OR TIRED: NO
AVERAGE NUMBER OF SLEEP HOURS: 8
HOW LIKELY ARE YOU TO NOD OFF OR FALL ASLEEP WHILE WATCHING TV: HIGH CHANCE OF DOZING
HOW LONG DO YOU NAP: OTHER
DO YOU GET TOO LITTLE SLEEP AT NIGHT: YES
HOW LIKELY ARE YOU TO NOD OFF OR FALL ASLEEP IN A CAR, WHILE STOPPED FOR A FEW MINUTES IN TRAFFIC: MODERATE CHANCE OF DOZING
DO YOU WORK SHIFTS: NO
HOW LIKELY ARE YOU TO NOD OFF OR FALL ASLEEP WHILE LYING DOWN TO REST IN THE AFTERNOON WHEN CIRCUMSTANCES PERMIT: HIGH CHANCE OF DOZING
HOW LIKELY ARE YOU TO NOD OFF OR FALL ASLEEP WHEN YOU ARE A PASSENGER IN A CAR FOR AN HOUR WITHOUT A BREAK: HIGH CHANCE OF DOZING
HOW LIKELY ARE YOU TO NOD OFF OR FALL ASLEEP WHILE LYING DOWN TO REST IN THE AFTERNOON WHEN CIRCUMSTANCES PERMIT: HIGH CHANCE OF DOZING
HAS YOUR RELATIONSHIP WITH FAMILY, FRIENDS OR WORK COLLEAGUES BEEN AFFECTED BECAUSE YOU ARE SLEEPY OR TIRED: YES, A LITTLE
DO YOU HAVE PROBLEMS WITH FREQUENT AWAKENINGS AT NIGHT: YES
DO YOU TAKE NAPS: YES
HOW LIKELY ARE YOU TO NOD OFF OR FALL ASLEEP WHILE SITTING AND TALKING TO SOMEONE: SLIGHT CHANCE OF DOZING
HOW LIKELY ARE YOU TO NOD OFF OR FALL ASLEEP WHILE SITTING QUIETLY AFTER LUNCH WITHOUT ALCOHOL: MODERATE CHANCE OF DOZING
HOW LIKELY ARE YOU TO NOD OFF OR FALL ASLEEP WHILE SITTING AND READING: HIGH CHANCE OF DOZING
AVERAGE NUMBER OF SLEEP HOURS: 8

## 2024-07-26 ENCOUNTER — OFFICE VISIT (OUTPATIENT)
Age: 59
End: 2024-07-26

## 2024-07-26 ENCOUNTER — TELEMEDICINE (OUTPATIENT)
Age: 59
End: 2024-07-26
Payer: COMMERCIAL

## 2024-07-26 ENCOUNTER — TELEPHONE (OUTPATIENT)
Age: 59
End: 2024-07-26

## 2024-07-26 DIAGNOSIS — Z71.89 COUNSELING AND COORDINATION OF CARE: ICD-10-CM

## 2024-07-26 DIAGNOSIS — G47.33 OSA (OBSTRUCTIVE SLEEP APNEA): Primary | ICD-10-CM

## 2024-07-26 DIAGNOSIS — I67.9 CEREBROVASCULAR DISEASE: ICD-10-CM

## 2024-07-26 DIAGNOSIS — Z13.9 ENCOUNTER FOR SCREENING INVOLVING SOCIAL DETERMINANTS OF HEALTH (SDOH): Primary | ICD-10-CM

## 2024-07-26 PROCEDURE — 99203 OFFICE O/P NEW LOW 30 MIN: CPT | Performed by: SPECIALIST
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
## 2024-07-26 NOTE — PATIENT INSTRUCTIONS
[facility] Food Resources*  (Call [facility]/ [facility] if need more resources.)       SNAP (formerly Food [place])  What they offer: SNAP is used like cash to buy eligible food items from authorized retailers.  Apply for benefits online: https://www.commonCooleafp.[place].gov/  Apply for benefits by phone: [phone] (M-F 8AM - 7PM; Sat 9AM - 12PM)  [employer] Hunger Hotline  What they offer:  The "[employer] Hunger Hotline connects individuals in need of food with a local food pantry or program across [address] [facility] and [facility] in [facility].   Website: https://Quote Roller/store-/ (search zip code)   Hunger Hotline Inquiry Form: https://[employer]/hunger-hotline/  Hunger Hotline Phone Number:  803-544-2500 x [phone] (M-F 9:00AM-4:00PM)    Meals on Wheels  Meals on Wheels is a program that delivers meals to individuals who have no reliable means for maintaining a healthy diet.  Services are provided by area.     "[employer] Service Area:   [facility], and [place].  [facility], [place], [facility], [place], and [place]  Website:  https://NTQ-Data.org/how-we-help/meals-on-wheels/  To Apply:  Ages 18-59:  Apply online: https://NTQ-Data.org/meals-on-wheels-application-form/  Apply by phone: [phone]          Meals on Wheels  [facility] Area (continued):  To Apply:  Ages 60+:  Apply Online: https://NTQ-Data.org/meals-on-wheels-application-form/  Apply By Phone: [phone] (M-F 8:30AM-5PM)  For [facility], [facility], [facility], [place], [place], [place] and [place]: You may also apply by calling [employer], [facility] Agency on [phone]  For [facility], and [place] as well as [place], [place] and [place]:    Contact [facility] Agency on [phone]    [place] Aging Service

## 2024-07-26 NOTE — TELEPHONE ENCOUNTER
SW attempted to reach patient for his virtual appointment. No answer, left message.    LEONARD Torrez   Navigator

## 2024-07-26 NOTE — PROGRESS NOTES
[facility] - [address]  [facility] - [place]  [address] [facility] [address]  Dept: [phone]           [address]  [place] [address]  Tel.  [phone]  Fax. [phone] [address]  [place] [address]  Tel.  [phone]  Fax. [phone] [address] [place] [address]  Tel.  [phone]  Fax. [phone]       Chief Complaint       Chief Complaint   Patient presents with    Sleep Apnea     NP; ref Dr Tato Angelo; recent stroke, HTN, DM, eval for RASHAD       HPI      Jaison Ray is 59 y.o. male seen for evaluation of a sleep disorder.  The patient's wife assists with the history.    He had a recent CVA for which he was hospitalized at [facility].  Snoring, apparent apnea and falls in oxygen were noted.  Residual right-sided weakness, speech difficulties.    His wife sleeps in a separate bedroom.  She notes prominent snoring.    Patient may doze if seated and inactive such as when reading, watching TV, as a passenger, seated quietly after lunch,    [place] Sleepiness Scale: 18    Patient has history of glaucoma, right eye vision blurred, no vision in left eye.    The patient has not undergone diagnostic testing for the current problems.             7/25/2024     1:57 PM   Sleep Medicine   Sitting and reading 3   Watching TV 3   Sitting, inactive in a public place (e.g. a theatre or a meeting) 1   As a passenger in a car for an hour without a break 3   Lying down to rest in the afternoon when circumstances permit 3   Sitting and talking to someone 1   Sitting quietly after a lunch without alcohol 2   In a car, while stopped for a few minutes in traffic 2   [place] Sleepiness Score 18        No Known Allergies      Current Outpatient Medications:     Blood Glucose Monitoring Suppl (ONE TOUCH ULTRA 2) w/Device KIT, 1 kit daily, Disp: , Rfl:     Lancets (ONETOUCH DELICA PLUS [id number]) MISC, 1 each

## 2024-08-08 ENCOUNTER — OFFICE VISIT (OUTPATIENT)
Age: 59
End: 2024-08-08
Payer: COMMERCIAL

## 2024-08-08 VITALS
HEART RATE: 65 BPM | OXYGEN SATURATION: 96 % | HEIGHT: 65 IN | DIASTOLIC BLOOD PRESSURE: 73 MMHG | BODY MASS INDEX: 27.86 KG/M2 | SYSTOLIC BLOOD PRESSURE: 127 MMHG | TEMPERATURE: 98.7 F | RESPIRATION RATE: 19 BRPM | WEIGHT: 167.2 LBS

## 2024-08-08 DIAGNOSIS — F41.9 ANXIETY AND DEPRESSION: ICD-10-CM

## 2024-08-08 DIAGNOSIS — E11.3533: Primary | ICD-10-CM

## 2024-08-08 DIAGNOSIS — F32.A ANXIETY AND DEPRESSION: ICD-10-CM

## 2024-08-08 DIAGNOSIS — I10 ESSENTIAL (PRIMARY) HYPERTENSION: ICD-10-CM

## 2024-08-08 PROBLEM — Z86.73 HISTORY OF CVA (CEREBROVASCULAR ACCIDENT): Status: ACTIVE | Noted: 2024-08-08

## 2024-08-08 PROCEDURE — 99214 OFFICE O/P EST MOD 30 MIN: CPT

## 2024-08-08 PROCEDURE — 3074F SYST BP LT 130 MM HG: CPT

## 2024-08-08 PROCEDURE — 3046F HEMOGLOBIN A1C LEVEL >9.0%: CPT

## 2024-08-08 PROCEDURE — 3078F DIAST BP <80 MM HG: CPT

## 2024-08-08 RX ORDER — GLUCOSAMINE HCL/CHONDROITIN SU 500-400 MG
CAPSULE ORAL
Qty: 200 STRIP | Refills: 3 | Status: SHIPPED | OUTPATIENT
Start: 2024-08-08

## 2024-08-08 ASSESSMENT — PATIENT HEALTH QUESTIONNAIRE - PHQ9
SUM OF ALL RESPONSES TO PHQ QUESTIONS 1-9: 10
2. FEELING DOWN, DEPRESSED OR HOPELESS: MORE THAN HALF THE DAYS
3. TROUBLE FALLING OR STAYING ASLEEP: MORE THAN HALF THE DAYS
SUM OF ALL RESPONSES TO PHQ QUESTIONS 1-9: 10
SUM OF ALL RESPONSES TO PHQ QUESTIONS 1-9: 4
1. LITTLE INTEREST OR PLEASURE IN DOING THINGS: MORE THAN HALF THE DAYS
SUM OF ALL RESPONSES TO PHQ9 QUESTIONS 1 & 2: 4
9. THOUGHTS THAT YOU WOULD BE BETTER OFF DEAD, OR OF HURTING YOURSELF: NOT AT ALL
SUM OF ALL RESPONSES TO PHQ QUESTIONS 1-9: 4
7. TROUBLE CONCENTRATING ON THINGS, SUCH AS READING THE NEWSPAPER OR WATCHING TELEVISION: NOT AT ALL
SUM OF ALL RESPONSES TO PHQ9 QUESTIONS 1 & 2: 4
SUM OF ALL RESPONSES TO PHQ QUESTIONS 1-9: 4
SUM OF ALL RESPONSES TO PHQ QUESTIONS 1-9: 4
4. FEELING TIRED OR HAVING LITTLE ENERGY: MORE THAN HALF THE DAYS
SUM OF ALL RESPONSES TO PHQ QUESTIONS 1-9: 10
6. FEELING BAD ABOUT YOURSELF - OR THAT YOU ARE A FAILURE OR HAVE LET YOURSELF OR YOUR FAMILY DOWN: SEVERAL DAYS
SUM OF ALL RESPONSES TO PHQ QUESTIONS 1-9: 10
5. POOR APPETITE OR OVEREATING: NOT AT ALL
2. FEELING DOWN, DEPRESSED OR HOPELESS: MORE THAN HALF THE DAYS
1. LITTLE INTEREST OR PLEASURE IN DOING THINGS: MORE THAN HALF THE DAYS
8. MOVING OR SPEAKING SO SLOWLY THAT OTHER PEOPLE COULD HAVE NOTICED. OR THE OPPOSITE, BEING SO FIGETY OR RESTLESS THAT YOU HAVE BEEN MOVING AROUND A LOT MORE THAN USUAL: SEVERAL DAYS
10. IF YOU CHECKED OFF ANY PROBLEMS, HOW DIFFICULT HAVE THESE PROBLEMS MADE IT FOR YOU TO DO YOUR WORK, TAKE CARE OF THINGS AT HOME, OR GET ALONG WITH OTHER PEOPLE: VERY DIFFICULT

## 2024-08-08 ASSESSMENT — ANXIETY QUESTIONNAIRES
GAD7 TOTAL SCORE: 7
6. BECOMING EASILY ANNOYED OR IRRITABLE: MORE THAN HALF THE DAYS
4. TROUBLE RELAXING: SEVERAL DAYS
3. WORRYING TOO MUCH ABOUT DIFFERENT THINGS: SEVERAL DAYS
7. FEELING AFRAID AS IF SOMETHING AWFUL MIGHT HAPPEN: SEVERAL DAYS
IF YOU CHECKED OFF ANY PROBLEMS ON THIS QUESTIONNAIRE, HOW DIFFICULT HAVE THESE PROBLEMS MADE IT FOR YOU TO DO YOUR WORK, TAKE CARE OF THINGS AT HOME, OR GET ALONG WITH OTHER PEOPLE: VERY DIFFICULT
5. BEING SO RESTLESS THAT IT IS HARD TO SIT STILL: NOT AT ALL
1. FEELING NERVOUS, ANXIOUS, OR ON EDGE: SEVERAL DAYS
2. NOT BEING ABLE TO STOP OR CONTROL WORRYING: SEVERAL DAYS

## 2024-08-08 NOTE — PROGRESS NOTES
Chief Complaint   Patient presents with    Diabetes    Hypertension     Vitals:    08/08/24 1328   BP: 127/73   Site: Right Upper Arm   Position: Sitting   Cuff Size: Large Adult   Pulse: 65   Resp: 19   Temp: 98.7 °F (37.1 °C)   TempSrc: Temporal   SpO2: 96%   Weight: 75.8 kg (167 lb 3.2 oz)   Height: 1.651 m (5' 5\")     \"Have you been to the ER, urgent care clinic since your last visit?  Hospitalized since your last visit?\"    NO    “Have you seen or consulted any other health care providers outside of [facility] since your last visit?”    NO      “Have you had a colorectal cancer screening such as a colonoscopy/FIT/Cologuard?    NO    No colonoscopy on file  No cologuard on file  No FIT/FOBT on file   No flexible sigmoidoscopy on file         Click Here for Release of Records Request

## 2024-08-08 NOTE — PROGRESS NOTES
[address] [place] [address]   Office [phone], Fax [phone]   Subjective     Chief Complaint   Patient presents with    Diabetes    Hypertension      Jaison Ray is a 59 y.o. male who presents for the above.    Getting cataract surgery on the 14th.    # Diabetes  - Poorly controlled, with hyperglycemia  - Home logs indicate fasting in the 200's and postprandials in the 300-400 range  - taking farxiga 10mg, glipizide 5mg, semaglutide 3mg  - Lantus was increased from 6u to 14u and semaglutide increased from 3mg to 7mg 2 weeks ago  - A1c 8.1 in hospital in June  - Notes continued polyuria and polydipsia     # HTN  - Taking amlodipine 10mg, Clinidine 0.2BID, Lisinopril 40mg daily.   - Increased coreg from 6.25mg to 12.5mg BID on 7/9, further increased to 25mg BID on 7/23  - Home logs indicate systolic BP predominately in the 140's     # Sleep study  - Will be in process of setting up sleep study. Had conference with sleep medicine doctor     # Prior CVA  - During hospitalization 6/15-6/18  - Reports following with cardiology and neurology  - Will be getting stress test, carotid duplex, SEBAS's  - Just finished stress test and MRI. Repeat visit 20th of this month with cardiologist at [facility]    Past Medical History  Past Medical History:   Diagnosis Date    Diabetes mellitus (HCC)     Hyperlipidemia     Hypertension     Stroke (HCC)        Current Medications  Current Outpatient Medications   Medication Sig Dispense Refill    blood glucose monitor strips Test 4 times a day & as needed for symptoms of irregular blood glucose. Dispense sufficient amount for indicated testing frequency plus additional to accommodate PRN testing needs. 200 strip 3    Blood Glucose Monitoring Suppl (ONE TOUCH ULTRA 2) w/Device KIT 1 kit daily      Lancets (ONETOUCH DELICA PLUS [id number]) MISC 1 each daily      ondansetron (ZOFRAN) 4 MG tablet Take 1 tablet by mouth every 8 hours as needed for Nausea

## 2024-08-08 NOTE — PATIENT INSTRUCTIONS
Please take glipizide 5mg twice per day (once before breakfast and once before dinner)    Please take insulin 20units nightly

## 2024-08-09 LAB
ANION GAP SERPL CALC-SCNC: 5 MMOL/L (ref 5–15)
BUN SERPL-MCNC: 64 MG/DL (ref 6–20)
BUN/CREAT SERPL: 26 (ref 12–20)
CALCIUM SERPL-MCNC: 9 MG/DL (ref 8.5–10.1)
CHLORIDE SERPL-SCNC: 107 MMOL/L (ref 97–108)
CO2 SERPL-SCNC: 25 MMOL/L (ref 21–32)
CREAT SERPL-MCNC: 2.46 MG/DL (ref 0.7–1.3)
EST. AVERAGE GLUCOSE BLD GHB EST-MCNC: 252 MG/DL
GLUCOSE SERPL-MCNC: 422 MG/DL (ref 65–100)
HBA1C MFR BLD: 10.4 % (ref 4–5.6)
POTASSIUM SERPL-SCNC: 4.9 MMOL/L (ref 3.5–5.1)
SODIUM SERPL-SCNC: 137 MMOL/L (ref 136–145)

## 2024-08-22 ENCOUNTER — OFFICE VISIT (OUTPATIENT)
Age: 59
End: 2024-08-22
Payer: MEDICARE

## 2024-08-22 VITALS
HEART RATE: 62 BPM | TEMPERATURE: 97.8 F | DIASTOLIC BLOOD PRESSURE: 71 MMHG | RESPIRATION RATE: 18 BRPM | SYSTOLIC BLOOD PRESSURE: 125 MMHG | HEIGHT: 65 IN | WEIGHT: 170.8 LBS | BODY MASS INDEX: 28.46 KG/M2 | OXYGEN SATURATION: 97 %

## 2024-08-22 DIAGNOSIS — I10 ESSENTIAL (PRIMARY) HYPERTENSION: ICD-10-CM

## 2024-08-22 DIAGNOSIS — Z79.4 TYPE 2 DIABETES MELLITUS WITH STAGE 3B CHRONIC KIDNEY DISEASE, WITH LONG-TERM CURRENT USE OF INSULIN (HCC): Primary | ICD-10-CM

## 2024-08-22 DIAGNOSIS — N18.32 STAGE 3B CHRONIC KIDNEY DISEASE (HCC): ICD-10-CM

## 2024-08-22 DIAGNOSIS — F32.A ANXIETY AND DEPRESSION: ICD-10-CM

## 2024-08-22 DIAGNOSIS — N18.32 TYPE 2 DIABETES MELLITUS WITH STAGE 3B CHRONIC KIDNEY DISEASE, WITH LONG-TERM CURRENT USE OF INSULIN (HCC): Primary | ICD-10-CM

## 2024-08-22 DIAGNOSIS — E11.22 TYPE 2 DIABETES MELLITUS WITH STAGE 3B CHRONIC KIDNEY DISEASE, WITH LONG-TERM CURRENT USE OF INSULIN (HCC): Primary | ICD-10-CM

## 2024-08-22 DIAGNOSIS — F41.9 ANXIETY AND DEPRESSION: ICD-10-CM

## 2024-08-22 PROCEDURE — 99214 OFFICE O/P EST MOD 30 MIN: CPT

## 2024-08-22 PROCEDURE — 3074F SYST BP LT 130 MM HG: CPT

## 2024-08-22 PROCEDURE — 3046F HEMOGLOBIN A1C LEVEL >9.0%: CPT

## 2024-08-22 PROCEDURE — 3078F DIAST BP <80 MM HG: CPT

## 2024-08-22 RX ORDER — INSULIN GLARGINE 100 [IU]/ML
20 INJECTION, SOLUTION SUBCUTANEOUS NIGHTLY
Qty: 5 ADJUSTABLE DOSE PRE-FILLED PEN SYRINGE | Refills: 3 | Status: SHIPPED | OUTPATIENT
Start: 2024-08-22

## 2024-08-22 RX ORDER — GLIPIZIDE 5 MG/1
5 TABLET ORAL 2 TIMES DAILY
Qty: 120 TABLET | Refills: 1 | Status: SHIPPED | OUTPATIENT
Start: 2024-08-22

## 2024-08-22 ASSESSMENT — PATIENT HEALTH QUESTIONNAIRE - PHQ9
SUM OF ALL RESPONSES TO PHQ QUESTIONS 1-9: 4
2. FEELING DOWN, DEPRESSED OR HOPELESS: NEARLY EVERY DAY
1. LITTLE INTEREST OR PLEASURE IN DOING THINGS: SEVERAL DAYS
SUM OF ALL RESPONSES TO PHQ QUESTIONS 1-9: 4
SUM OF ALL RESPONSES TO PHQ9 QUESTIONS 1 & 2: 4

## 2024-08-22 NOTE — PATIENT INSTRUCTIONS
You can take an extra glipizide (10mg total) before dinner if you are persistently above 250 on your sugar log after dinner

## 2024-08-22 NOTE — PROGRESS NOTES
[address] [place] [address]   Office [phone], Fax [phone]   Subjective     Chief Complaint   Patient presents with    Diabetes      Jaison Ray is a 59 y.o. male who presents for the above.    Got cataract surgery on R eye on the 14th. Went well per patient. Can see a bit better    # Diabetes  - Home logs indicate fasting in the range of 140-180's predominately and postprandials in the 180's-200's  -  Taking glipizide 5mg BID, semaglutide 7mg, lantus 20u nightly  - Got diabetic eye exam recently     # HTN  - Taking amlodipine 10mg, Clinidine 0.2BID, Lisinopril 40mg, coreg 25 BID     # Sleep study  - Will be in process of setting up sleep study. Had conference with sleep medicine doctor  - Has appointment Thursday     # Prior CVA  - During hospitalization 6/15-6/18  - Reports following with cardiology and neurology  - Got stress test, carotid duplex, SEBAS's   - Will be getting results this upcoming tuesday  - Just finished stress test and MRI. Repeat visit 20th of this month with cardiologist at [facility]    Past Medical History  Past Medical History:   Diagnosis Date    Diabetes mellitus (HCC)     Hyperlipidemia     Hypertension     Stroke (HCC)        Current Medications  Current Outpatient Medications   Medication Sig Dispense Refill    blood glucose monitor strips Test 4 times a day & as needed for symptoms of irregular blood glucose. Dispense sufficient amount for indicated testing frequency plus additional to accommodate PRN testing needs. 200 strip 3    Blood Glucose Monitoring Suppl (ONE TOUCH ULTRA 2) w/Device KIT 1 kit daily      Lancets (ONETOUCH DELICA PLUS [id number]) MISC 1 each daily      ondansetron (ZOFRAN) 4 MG tablet Take 1 tablet by mouth every 8 hours as needed for Nausea for nausea      Semaglutide 7 MG TABS Take 7 mg by mouth daily 90 tablet 1    carvedilol (COREG) 25 MG tablet Take 1 tablet by mouth 2 times daily 60 tablet 3    ASPIRIN LOW DOSE 81 MG  Complex Repair And Modified Advancement Flap Text: The defect edges were debeveled with a #15 scalpel blade.  The primary defect was closed partially with a complex linear closure.  Given the location of the remaining defect, shape of the defect and the proximity to free margins a modified advancement flap was deemed most appropriate for complete closure of the defect.  Using a sterile surgical marker, an appropriate advancement flap was drawn incorporating the defect and placing the expected incisions within the relaxed skin tension lines where possible.    The area thus outlined was incised deep to adipose tissue with a #15 scalpel blade.  The skin margins were undermined to an appropriate distance in all directions utilizing iris scissors.

## 2024-08-22 NOTE — PROGRESS NOTES
Identified pt with two pt identifiers(name and ). Reviewed record in preparation for visit and have obtained necessary documentation.  Chief Complaint   Patient presents with    Diabetes        Health Maintenance Due   Topic    Pneumococcal 0-64 years Vaccine (1 of 2 - PCV)    HIV screen     Diabetic retinal exam     Hepatitis C screen     Hepatitis B vaccine (1 of 3 - 19+ 3-dose series)    Colorectal Cancer Screen     Shingles vaccine (1 of 2)    COVID-19 Vaccine (2 -  season)    Diabetic foot exam     Annual Wellness Visit (Medicare Advantage)     Flu vaccine (1)       Vitals:    24 1306   BP: 125/71   Site: Right Upper Arm   Position: Sitting   Cuff Size: Medium Adult   Pulse: 62   Resp: 18   Temp: 97.8 °F (36.6 °C)   TempSrc: Oral   SpO2: 97%   Weight: 77.5 kg (170 lb 12.8 oz)   Height: 1.651 m (5' 5\")         \"Have you been to the ER, urgent care clinic since your last visit?  Hospitalized since your last visit?\"    NO    “Have you seen or consulted any other health care providers outside of [facility] since your last visit?”    NO      “Have you had a colorectal cancer screening such as a colonoscopy/FIT/Cologuard?    NO    No colonoscopy on file  No cologuard on file  No FIT/FOBT on file   No flexible sigmoidoscopy on file         Click Here for Release of Records Request     This patient is accompanied in the office by his spouse.  I have received verbal consent from Jaison Ray to discuss any/all medical information while they are present in the room.

## 2024-08-29 ENCOUNTER — HOSPITAL ENCOUNTER (OUTPATIENT)
Facility: HOSPITAL | Age: 59
Discharge: HOME | End: 2024-08-29
Payer: MEDICARE

## 2024-08-29 ENCOUNTER — TELEPHONE (OUTPATIENT)
Age: 59
End: 2024-08-29

## 2024-08-29 VITALS
WEIGHT: 170 LBS | OXYGEN SATURATION: 96 % | TEMPERATURE: 98.4 F | BODY MASS INDEX: 28.32 KG/M2 | HEIGHT: 65 IN | HEART RATE: 63 BPM

## 2024-08-29 DIAGNOSIS — E11.22 TYPE 2 DIABETES MELLITUS WITH STAGE 3B CHRONIC KIDNEY DISEASE, WITH LONG-TERM CURRENT USE OF INSULIN (HCC): Primary | ICD-10-CM

## 2024-08-29 DIAGNOSIS — N18.32 TYPE 2 DIABETES MELLITUS WITH STAGE 3B CHRONIC KIDNEY DISEASE, WITH LONG-TERM CURRENT USE OF INSULIN (HCC): Primary | ICD-10-CM

## 2024-08-29 DIAGNOSIS — Z79.4 TYPE 2 DIABETES MELLITUS WITH STAGE 3B CHRONIC KIDNEY DISEASE, WITH LONG-TERM CURRENT USE OF INSULIN (HCC): Primary | ICD-10-CM

## 2024-08-29 DIAGNOSIS — G47.33 OSA (OBSTRUCTIVE SLEEP APNEA): ICD-10-CM

## 2024-08-29 PROCEDURE — 95810 POLYSOM 6/> YRS 4/> PARAM: CPT | Performed by: SPECIALIST

## 2024-08-29 NOTE — PROGRESS NOTES
Patient placed on semaglutide. Informed that insurance does not cover this and that trulicity is a preferred option. I am discontinuing semaglutide and transitioning to trulicity. Will inform patient via telephone    Tato Angelo, DO

## 2024-08-29 NOTE — TELEPHONE ENCOUNTER
Pt's spouse called to inquire about 2 medications that you said you would fill. When asked which medications, she stated Glipizide and she did not know the other medication. She was informed that both were sent to pharmacy. This writer informed her that pharmacy would be contacted    This writer called and spoke with the Pharmacist who stated that Glipizide was not approved due to pt picking up a Rx for 1 a day. Pharmacist was able to override the rejection and stated that the Glipizide would be ready for  today. Pt's spouse was informed.    Semuglutide appears to be the other medication, EPIC shows a denial letter. Please inform pt of what would be the next step to filling medication or if an alternative med will be prescribed.    Thank you

## 2024-09-13 ENCOUNTER — CLINICAL DOCUMENTATION (OUTPATIENT)
Age: 59
End: 2024-09-13

## 2024-09-13 ENCOUNTER — LAB (OUTPATIENT)
Age: 59
End: 2024-09-13

## 2024-09-13 DIAGNOSIS — I67.9 CEREBROVASCULAR DISEASE: ICD-10-CM

## 2024-09-13 DIAGNOSIS — G47.33 OSA (OBSTRUCTIVE SLEEP APNEA): Primary | ICD-10-CM

## 2024-09-13 DIAGNOSIS — N18.32 STAGE 3B CHRONIC KIDNEY DISEASE (HCC): ICD-10-CM

## 2024-09-13 DIAGNOSIS — I10 ESSENTIAL (PRIMARY) HYPERTENSION: ICD-10-CM

## 2024-09-14 ENCOUNTER — TELEPHONE (OUTPATIENT)
Age: 59
End: 2024-09-14

## 2024-09-14 LAB
ANION GAP SERPL CALC-SCNC: 4 MMOL/L (ref 2–12)
BUN SERPL-MCNC: 66 MG/DL (ref 6–20)
BUN/CREAT SERPL: 35 (ref 12–20)
CALCIUM SERPL-MCNC: 9.1 MG/DL (ref 8.5–10.1)
CHLORIDE SERPL-SCNC: 114 MMOL/L (ref 97–108)
CO2 SERPL-SCNC: 24 MMOL/L (ref 21–32)
CREAT SERPL-MCNC: 1.89 MG/DL (ref 0.7–1.3)
GLUCOSE SERPL-MCNC: 51 MG/DL (ref 65–100)
POTASSIUM SERPL-SCNC: 4.4 MMOL/L (ref 3.5–5.1)
SODIUM SERPL-SCNC: 142 MMOL/L (ref 136–145)

## 2024-09-16 ENCOUNTER — TELEPHONE (OUTPATIENT)
Age: 59
End: 2024-09-16

## 2024-09-17 DIAGNOSIS — Z79.4 TYPE 2 DIABETES MELLITUS WITH STAGE 3B CHRONIC KIDNEY DISEASE, WITH LONG-TERM CURRENT USE OF INSULIN (HCC): Primary | ICD-10-CM

## 2024-09-17 DIAGNOSIS — E11.22 TYPE 2 DIABETES MELLITUS WITH STAGE 3B CHRONIC KIDNEY DISEASE, WITH LONG-TERM CURRENT USE OF INSULIN (HCC): Primary | ICD-10-CM

## 2024-09-17 DIAGNOSIS — I10 ESSENTIAL (PRIMARY) HYPERTENSION: ICD-10-CM

## 2024-09-17 DIAGNOSIS — N18.32 TYPE 2 DIABETES MELLITUS WITH STAGE 3B CHRONIC KIDNEY DISEASE, WITH LONG-TERM CURRENT USE OF INSULIN (HCC): Primary | ICD-10-CM

## 2024-09-17 RX ORDER — AMLODIPINE BESYLATE 10 MG/1
10 TABLET ORAL DAILY
Qty: 90 TABLET | Refills: 3 | Status: SHIPPED | OUTPATIENT
Start: 2024-09-17

## 2024-09-17 RX ORDER — CLONIDINE HYDROCHLORIDE 0.2 MG/1
0.2 TABLET ORAL 2 TIMES DAILY
Qty: 180 TABLET | Refills: 3 | Status: SHIPPED | OUTPATIENT
Start: 2024-09-17

## 2024-09-17 RX ORDER — ATORVASTATIN CALCIUM 80 MG/1
80 TABLET, FILM COATED ORAL DAILY
Qty: 90 TABLET | Refills: 3 | Status: SHIPPED | OUTPATIENT
Start: 2024-09-17

## 2024-10-03 ENCOUNTER — TELEPHONE (OUTPATIENT)
Age: 59
End: 2024-10-03

## 2024-10-03 NOTE — TELEPHONE ENCOUNTER
Patient wife stop by the office to request a refill on     insulin glargine (LANTUS SOLOSTAR) 100 UNIT/ML injection pen     Sent     [facility]/pharmacy #[id number] - [place], [place] - [address] - P [phone] - F [phone] [address] [place] [address]  Phone: [phone]  Fax: [phone]

## 2024-10-04 ENCOUNTER — OFFICE VISIT (OUTPATIENT)
Age: 59
End: 2024-10-04
Payer: MEDICARE

## 2024-10-04 VITALS
DIASTOLIC BLOOD PRESSURE: 90 MMHG | HEIGHT: 65 IN | WEIGHT: 175.8 LBS | TEMPERATURE: 99.1 F | SYSTOLIC BLOOD PRESSURE: 167 MMHG | BODY MASS INDEX: 29.29 KG/M2 | OXYGEN SATURATION: 97 % | HEART RATE: 81 BPM | RESPIRATION RATE: 14 BRPM

## 2024-10-04 DIAGNOSIS — K59.00 CONSTIPATION, UNSPECIFIED CONSTIPATION TYPE: ICD-10-CM

## 2024-10-04 DIAGNOSIS — K59.00 CONSTIPATION, UNSPECIFIED CONSTIPATION TYPE: Primary | ICD-10-CM

## 2024-10-04 DIAGNOSIS — N18.32 TYPE 2 DIABETES MELLITUS WITH STAGE 3B CHRONIC KIDNEY DISEASE, WITH LONG-TERM CURRENT USE OF INSULIN (HCC): ICD-10-CM

## 2024-10-04 DIAGNOSIS — Z12.11 COLON CANCER SCREENING: ICD-10-CM

## 2024-10-04 DIAGNOSIS — E11.22 TYPE 2 DIABETES MELLITUS WITH STAGE 3B CHRONIC KIDNEY DISEASE, WITH LONG-TERM CURRENT USE OF INSULIN (HCC): ICD-10-CM

## 2024-10-04 DIAGNOSIS — Z79.4 TYPE 2 DIABETES MELLITUS WITH STAGE 3B CHRONIC KIDNEY DISEASE, WITH LONG-TERM CURRENT USE OF INSULIN (HCC): ICD-10-CM

## 2024-10-04 PROCEDURE — 99213 OFFICE O/P EST LOW 20 MIN: CPT

## 2024-10-04 RX ORDER — INSULIN GLARGINE 100 [IU]/ML
20 INJECTION, SOLUTION SUBCUTANEOUS NIGHTLY
Qty: 5 ADJUSTABLE DOSE PRE-FILLED PEN SYRINGE | Refills: 3 | Status: SHIPPED | OUTPATIENT
Start: 2024-10-04

## 2024-10-04 RX ORDER — DOCUSATE SODIUM 100 MG/1
100 CAPSULE, LIQUID FILLED ORAL 2 TIMES DAILY
Qty: 60 CAPSULE | Refills: 1 | Status: SHIPPED | OUTPATIENT
Start: 2024-10-04 | End: 2024-12-03

## 2024-10-04 RX ORDER — POLYETHYLENE GLYCOL 3350 17 G/17G
17 POWDER, FOR SOLUTION ORAL DAILY PRN
Qty: 510 G | Refills: 0 | Status: SHIPPED | OUTPATIENT
Start: 2024-10-04 | End: 2024-11-03

## 2024-10-04 NOTE — PATIENT INSTRUCTIONS
- Take miralax 1-2 times per day for the next 2 weeks, then as needed after  - Take colace twice daily for the next 2 weeks, then as needed after  - Take metamucil fiber supplement (over the counter) every day  - Increase water intake to 5 bottles per day

## 2024-10-04 NOTE — PROGRESS NOTES
Jaison Ray is a 59 y.o. male    Chief Complaint   Patient presents with    Follow-Up from Hospital     ED visit       \"Have you been to the ER, urgent care clinic since your last visit?  Hospitalized since your last visit?\"    NO    “Have you seen or consulted any other health care providers outside of [facility] since your last visit?”    NO    “Have you had a colorectal cancer screening such as a colonoscopy/FIT/Cologuard?    NO    No colonoscopy on file  No cologuard on file  No FIT/FOBT on file   No flexible sigmoidoscopy on file             Vitals:    10/04/24 1020   BP: (!) 167/90   Pulse:    Resp:    Temp:    SpO2:           No data to display              Health Maintenance Due   Topic Date Due    Pneumococcal 0-64 years Vaccine (1 of 2 - PCV) Never done    HIV screen  Never done    Diabetic retinal exam  Never done    Hepatitis C screen  Never done    Hepatitis B vaccine (1 of 3 - 19+ 3-dose series) Never done    Colorectal Cancer Screen  Never done    Shingles vaccine (1 of 2) Never done    Annual Wellness Visit (Medicare Advantage)  Never done    Flu vaccine (1) Never done    COVID-19 Vaccine (2 - 2023-24 season) 09/01/2024     
PRN testing needs. 200 strip 3    Blood Glucose Monitoring Suppl (ONE TOUCH ULTRA 2) w/Device KIT 1 kit daily      Lancets (ONETOUCH DELICA PLUS [id number]) MISC 1 each daily      ondansetron (ZOFRAN) 4 MG tablet Take 1 tablet by mouth every 8 hours as needed for Nausea for nausea      carvedilol (COREG) 25 MG tablet Take 1 tablet by mouth 2 times daily 60 tablet 3    ASPIRIN LOW DOSE 81 MG EC tablet Take 1 tablet by mouth daily      clopidogrel (PLAVIX) 75 MG tablet Take 1 tablet by mouth daily      Insulin Pen Needle ([employer] PEN NEEDLES) 31G X 6 MM MISC 1 each by Does not apply route daily 100 each 3    lisinopril (PRINIVIL;ZESTRIL) 40 MG tablet Take 1 tablet by mouth daily      insulin glargine (LANTUS SOLOSTAR) 100 UNIT/ML injection pen Inject 20 Units into the skin nightly 5 Adjustable Dose Pre-filled Pen Syringe 3     No current facility-administered medications for this visit.       Objective   Vital Signs  BP (!) 167/90   Pulse 81   Temp 99.1 °F (37.3 °C)   Resp 14   Ht 1.651 m (5' 5\")   Wt 79.7 kg (175 lb 12.8 oz)   SpO2 97%   BMI 29.25 kg/m²     Physical Examination  Abd: Normal bowel sounds, mildly distended and taught, non-tender    Assessment and Plan   Jaison Ray is a 59 y.o. who is here for ER follow up for constipation and urinary retention. Urinating well now. Will initiate bowel regimen. Follow up in 2-4 weeks.     I reviewed records available from visit:   - CT abdomen showed significant constipation, distended bladder, no further pathology  - CMP indicated renal function at baseline with Cr of 1.84 and GFR of 41 (CKD 3b)    Of note:  - Did not take BP medications yet today and historically well controlled on medication    Diagnoses and Associated Orders:  1. Constipation, unspecified constipation type  -     polyethylene glycol (GLYCOLAX) 17 GM/SCOOP powder; Take 17 g by mouth daily as needed (Take 1-2 times daily), Disp-510 g, R-0Normal  -     docusate sodium (COLACE) 100 MG capsule; Take

## 2024-10-10 RX ORDER — POLYETHYLENE GLYCOL 3350
GRANULES (GRAM) MISCELLANEOUS
Qty: 510 G | Refills: 0 | Status: SHIPPED | OUTPATIENT
Start: 2024-10-10

## 2024-11-15 ENCOUNTER — TELEPHONE (OUTPATIENT)
Age: 59
End: 2024-11-15

## 2024-11-15 DIAGNOSIS — E11.3533: ICD-10-CM

## 2024-11-15 NOTE — TELEPHONE ENCOUNTER
Patient's wife called in regards to patient needing a refill on his pen needles. She stated that the prescription is always written incorrectly because it is written for a 30 day supply and not a 90 day supply. She stated that if it is written for a 30 day supply the box has be broken and they can't do that. Patient is down to his last 2 pen needles. Wife stated that patient uses bd uf micro pen needle [id number]. Would like for prescription to be sent to [facility] on [address] [address] [facility].    Thanks!

## 2024-11-17 RX ORDER — PEN NEEDLE, DIABETIC 31 G X1/4"
1 NEEDLE, DISPOSABLE MISCELLANEOUS DAILY
Qty: 100 EACH | Refills: 3 | Status: SHIPPED | OUTPATIENT
Start: 2024-11-17

## 2024-11-18 ENCOUNTER — TELEPHONE (OUTPATIENT)
Age: 59
End: 2024-11-18

## 2024-11-18 NOTE — TELEPHONE ENCOUNTER
Giuliano PT with [facility] called to inform you that pt was evaluated. Pt is walking at his prior level and has been discharged.    Thank you

## 2024-11-20 ENCOUNTER — TELEPHONE (OUTPATIENT)
Age: 59
End: 2024-11-20

## 2024-11-20 NOTE — TELEPHONE ENCOUNTER
Darlene with [facility] stated that she evaluated pt yesterday and need a verbal order to proceed with her plan of care. Her contact number is [phone]    Thank you

## 2024-11-20 NOTE — TELEPHONE ENCOUNTER
This writer called and spoke with Darlene with [facility] regarding verbal orders for Mr. Ray, per Dr. Zamora, she may proceed with the plan of care of the pt.

## 2024-11-22 ENCOUNTER — TELEPHONE (OUTPATIENT)
Age: 59
End: 2024-11-22

## 2024-11-22 NOTE — TELEPHONE ENCOUNTER
----- Message from Nancy SALCIDO sent at 11/22/2024 11:36 AM EST -----  Regarding: ECC Message to Provider  ECC Message to Provider    Relationship to Patient: Covered Entity [facility]    Additional Information speech therapy that completed evaluated today and they wanted to see him and working on voice and short term memory.  --------------------------------------------------------------------------------------------------------------------------    Call Back Information: OK to leave message on voicemail  Preferred Call Back Number: Phone [phone]

## 2024-11-22 NOTE — TELEPHONE ENCOUNTER
Patient evaluated 11/22/24 by speech therapy (ST) who is recommending that patient continue therapy.    Requested Speech Therapy Orders:  Twice a week for 4 weeks, then  Once per week for 2 weeks    This nurse informed therapist that message will be sent to provider for approval and that office will call provided number back with providers decision.

## 2024-11-25 ENCOUNTER — OFFICE VISIT (OUTPATIENT)
Age: 59
End: 2024-11-25
Payer: MEDICARE

## 2024-11-25 VITALS
BODY MASS INDEX: 27.49 KG/M2 | DIASTOLIC BLOOD PRESSURE: 76 MMHG | SYSTOLIC BLOOD PRESSURE: 143 MMHG | OXYGEN SATURATION: 98 % | WEIGHT: 165 LBS | RESPIRATION RATE: 18 BRPM | HEIGHT: 65 IN | TEMPERATURE: 97.8 F | HEART RATE: 65 BPM

## 2024-11-25 DIAGNOSIS — N18.32 STAGE 3B CHRONIC KIDNEY DISEASE (HCC): ICD-10-CM

## 2024-11-25 DIAGNOSIS — I10 ESSENTIAL (PRIMARY) HYPERTENSION: ICD-10-CM

## 2024-11-25 DIAGNOSIS — E11.3533: Primary | ICD-10-CM

## 2024-11-25 PROCEDURE — 99213 OFFICE O/P EST LOW 20 MIN: CPT

## 2024-11-25 RX ORDER — DAPAGLIFLOZIN 10 MG/1
10 TABLET, FILM COATED ORAL DAILY
COMMUNITY

## 2024-11-25 ASSESSMENT — PATIENT HEALTH QUESTIONNAIRE - PHQ9
SUM OF ALL RESPONSES TO PHQ9 QUESTIONS 1 & 2: 4
SUM OF ALL RESPONSES TO PHQ QUESTIONS 1-9: 4
2. FEELING DOWN, DEPRESSED OR HOPELESS: MORE THAN HALF THE DAYS
SUM OF ALL RESPONSES TO PHQ QUESTIONS 1-9: 4
1. LITTLE INTEREST OR PLEASURE IN DOING THINGS: MORE THAN HALF THE DAYS

## 2024-11-25 NOTE — PROGRESS NOTES
Chief Complaint   Patient presents with    Follow-Up from Hospital     Vitals:    11/25/24 1443   BP: (!) 143/76   Site: Right Upper Arm   Position: Sitting   Cuff Size: Medium Adult   Pulse: 65   Resp: 18   Temp: 97.8 °F (36.6 °C)   TempSrc: Temporal   SpO2: 98%   Weight: 74.8 kg (165 lb)   Height: 1.651 m (5' 5\")     \"Have you been to the ER, urgent care clinic since your last visit?  Hospitalized since your last visit?\"    Went to [facility] on 10/14/2024 for mini strokes and UTI.     “Have you seen or consulted any other health care providers outside of [facility] since your last visit?”    NO      “Have you had a colorectal cancer screening such as a colonoscopy/FIT/Cologuard?    NO    No colonoscopy on file  No cologuard on file  No FIT/FOBT on file   No flexible sigmoidoscopy on file         Click Here for Release of Records Request

## 2024-11-25 NOTE — PROGRESS NOTES
[address] [place] [address]   Office [phone], Fax [phone]   Subjective     Chief Complaint   Patient presents with    Follow-Up from Hospital      Jaison Ray is a 59 y.o. male who presents for the above.    Went to ER with urinary retention in [place] on Sept 20th. (Unable to pee when trying for a couple hours)  - Got an in and out catheterization  - CT abdomen showed significant constipation, distended bladder, no further pathology  - CMP indicated renal function at baseline with Cr of 1.84 and GFR of 41 (CKD 3b)  - Was very constipated, given suppository and had BM  - Urinating well since  - Has only tried suppository once since being at home, has not started bowel regimen  - Has not pooped for one week  - Drinks 2-3 bottles of water per day    11/25/24:  Oct 14-18: had fallen and couldn't get up. Diagnosed with multiple small strokes. Still with weakness in left upper extremity  - Then went to inpatient rehab  - Home therapy initiated, continuing (OT, Speech)  - Glucose 151 in AM. Checking sporadically.  - Getting sleep study.     Past Medical History  Past Medical History:   Diagnosis Date    Diabetes mellitus (HCC)     Hyperlipidemia     Hypertension     Stroke (HCC)      Current Medications  Current Outpatient Medications   Medication Sig Dispense Refill    FARXIGA 10 MG tablet Take 1 tablet by mouth daily      sertraline (ZOLOFT) 50 MG tablet Take 1 tablet by mouth daily      Insulin Pen Needle ([employer] PEN NEEDLES) 31G X 6 MM MISC 1 each by Does not apply route daily 100 each 3    Polyethylene Glycol 3350 GRAN TAKE 17 G BY MOUTH DAILY AS NEEDED (TAKE 1-2 TIMES DAILY) 510 g 0    insulin glargine (LANTUS SOLOSTAR) 100 UNIT/ML injection pen Inject 20 Units into the skin nightly (Patient taking differently: Inject 10 Units into the skin nightly) 5 Adjustable Dose Pre-filled Pen Syringe 3    docusate sodium (COLACE) 100 MG capsule Take 1 capsule by mouth 2 times daily 60 capsule 1

## 2024-11-27 LAB
ANION GAP SERPL CALC-SCNC: 4 MMOL/L (ref 2–12)
BUN SERPL-MCNC: 46 MG/DL (ref 6–20)
BUN/CREAT SERPL: 23 (ref 12–20)
CALCIUM SERPL-MCNC: 9.2 MG/DL (ref 8.5–10.1)
CHLORIDE SERPL-SCNC: 109 MMOL/L (ref 97–108)
CO2 SERPL-SCNC: 26 MMOL/L (ref 21–32)
CREAT SERPL-MCNC: 2.03 MG/DL (ref 0.7–1.3)
ERYTHROCYTE [DISTWIDTH] IN BLOOD BY AUTOMATED COUNT: 12.4 % (ref 11.5–14.5)
EST. AVERAGE GLUCOSE BLD GHB EST-MCNC: 137 MG/DL
GLUCOSE SERPL-MCNC: 213 MG/DL (ref 65–100)
HBA1C MFR BLD: 6.4 % (ref 4–5.6)
HCT VFR BLD AUTO: 32.6 % (ref 36.6–50.3)
HGB BLD-MCNC: 10.8 G/DL (ref 12.1–17)
MCH RBC QN AUTO: 28.6 PG (ref 26–34)
MCHC RBC AUTO-ENTMCNC: 33.1 G/DL (ref 30–36.5)
MCV RBC AUTO: 86.5 FL (ref 80–99)
NRBC # BLD: 0 K/UL (ref 0–0.01)
NRBC BLD-RTO: 0 PER 100 WBC
PLATELET # BLD AUTO: 215 K/UL (ref 150–400)
PMV BLD AUTO: 10.6 FL (ref 8.9–12.9)
POTASSIUM SERPL-SCNC: 4.6 MMOL/L (ref 3.5–5.1)
RBC # BLD AUTO: 3.77 M/UL (ref 4.1–5.7)
SODIUM SERPL-SCNC: 139 MMOL/L (ref 136–145)
WBC # BLD AUTO: 7 K/UL (ref 4.1–11.1)

## 2024-12-09 ENCOUNTER — OFFICE VISIT (OUTPATIENT)
Age: 59
End: 2024-12-09
Payer: MEDICARE

## 2024-12-09 VITALS
RESPIRATION RATE: 18 BRPM | HEART RATE: 61 BPM | DIASTOLIC BLOOD PRESSURE: 78 MMHG | TEMPERATURE: 97.8 F | SYSTOLIC BLOOD PRESSURE: 132 MMHG | OXYGEN SATURATION: 98 % | HEIGHT: 65 IN | BODY MASS INDEX: 28.29 KG/M2 | WEIGHT: 169.8 LBS

## 2024-12-09 DIAGNOSIS — R31.9 HEMATURIA, UNSPECIFIED TYPE: ICD-10-CM

## 2024-12-09 DIAGNOSIS — F32.A ANXIETY AND DEPRESSION: ICD-10-CM

## 2024-12-09 DIAGNOSIS — I10 ESSENTIAL (PRIMARY) HYPERTENSION: ICD-10-CM

## 2024-12-09 DIAGNOSIS — N18.32 STAGE 3B CHRONIC KIDNEY DISEASE (HCC): ICD-10-CM

## 2024-12-09 DIAGNOSIS — E11.22 TYPE 2 DIABETES MELLITUS WITH STAGE 3B CHRONIC KIDNEY DISEASE, WITH LONG-TERM CURRENT USE OF INSULIN (HCC): Primary | ICD-10-CM

## 2024-12-09 DIAGNOSIS — N18.32 TYPE 2 DIABETES MELLITUS WITH STAGE 3B CHRONIC KIDNEY DISEASE, WITH LONG-TERM CURRENT USE OF INSULIN (HCC): Primary | ICD-10-CM

## 2024-12-09 DIAGNOSIS — Z79.4 TYPE 2 DIABETES MELLITUS WITH STAGE 3B CHRONIC KIDNEY DISEASE, WITH LONG-TERM CURRENT USE OF INSULIN (HCC): Primary | ICD-10-CM

## 2024-12-09 DIAGNOSIS — F41.9 ANXIETY AND DEPRESSION: ICD-10-CM

## 2024-12-09 DIAGNOSIS — R30.0 DYSURIA: ICD-10-CM

## 2024-12-09 PROCEDURE — 99213 OFFICE O/P EST LOW 20 MIN: CPT

## 2024-12-09 ASSESSMENT — PATIENT HEALTH QUESTIONNAIRE - PHQ9
SUM OF ALL RESPONSES TO PHQ QUESTIONS 1-9: 4
2. FEELING DOWN, DEPRESSED OR HOPELESS: MORE THAN HALF THE DAYS
SUM OF ALL RESPONSES TO PHQ QUESTIONS 1-9: 4
1. LITTLE INTEREST OR PLEASURE IN DOING THINGS: MORE THAN HALF THE DAYS
SUM OF ALL RESPONSES TO PHQ QUESTIONS 1-9: 4
SUM OF ALL RESPONSES TO PHQ QUESTIONS 1-9: 4
SUM OF ALL RESPONSES TO PHQ9 QUESTIONS 1 & 2: 4

## 2024-12-09 NOTE — PROGRESS NOTES
Chief Complaint   Patient presents with    Follow-up Chronic Condition     Vitals:    12/09/24 1458   BP: 132/78   Site: Right Upper Arm   Position: Sitting   Cuff Size: Medium Adult   Pulse: 61   Resp: 18   Temp: 97.8 °F (36.6 °C)   TempSrc: Temporal   SpO2: 98%   Weight: 77 kg (169 lb 12.8 oz)   Height: 1.651 m (5' 5\")     \"Have you been to the ER, urgent care clinic since your last visit?  Hospitalized since your last visit?\"    NO    “Have you seen or consulted any other health care providers outside of [facility] since your last visit?”    NO      “Have you had a colorectal cancer screening such as a colonoscopy/FIT/Cologuard?    NO    No colonoscopy on file  No cologuard on file  No FIT/FOBT on file   No flexible sigmoidoscopy on file         Click Here for Release of Records Request

## 2024-12-09 NOTE — PROGRESS NOTES
[address] [place] [address]   Office [phone], Fax [phone]   Subjective     Chief Complaint   Patient presents with    Follow-up Chronic Condition      Jaison Ray is a 59 y.o. male who presents for the above.    11/25/24:  Oct 14-18: had fallen and couldn't get up. Diagnosed with multiple small strokes. Still with weakness in left upper extremity  - Then went to inpatient rehab  - Home therapy initiated, continuing (OT, Speech)  - Glucose 151 in AM. Checking sporadically.  - Getting sleep study.     12/9/24:  - Returns for med rec and follow up  - Doing well. Wife is not present at today's visit which limits history as she helps him with all of his meds and pt is relatively poor historian himself.   - No concerns noted from patient today    Past Medical History  Past Medical History:   Diagnosis Date    Diabetes mellitus (HCC)     Hyperlipidemia     Hypertension     Stroke (HCC)      Current Medications  Current Outpatient Medications   Medication Sig Dispense Refill    glipiZIDE (GLUCOTROL) 5 MG tablet Take 1 tablet by mouth 2 times daily 120 tablet 1    cloNIDine (CATAPRES) 0.2 MG tablet Take 1 tablet by mouth 2 times daily 180 tablet 3    carvedilol (COREG) 25 MG tablet Take 1 tablet by mouth 2 times daily 60 tablet 3    atorvastatin (LIPITOR) 80 MG tablet Take 1 tablet by mouth daily 90 tablet 3    amLODIPine (NORVASC) 10 MG tablet Take 1 tablet by mouth daily 90 tablet 3    sertraline (ZOLOFT) 50 MG tablet Take 1 tablet by mouth daily 90 tablet 3    lisinopril (PRINIVIL;ZESTRIL) 40 MG tablet Take 1 tablet by mouth daily 90 tablet 3    insulin glargine (LANTUS SOLOSTAR) 100 UNIT/ML injection pen Inject 10 Units into the skin nightly 9 mL 1    FARXIGA 10 MG tablet Take 1 tablet by mouth daily      Insulin Pen Needle ([employer] PEN NEEDLES) 31G X 6 MM MISC 1 each by Does not apply route daily 100 each 3    Polyethylene Glycol 3350 GRAN TAKE 17 G BY MOUTH DAILY AS NEEDED (TAKE 1-2 TIMES

## 2024-12-10 ENCOUNTER — LAB (OUTPATIENT)
Age: 59
End: 2024-12-10

## 2024-12-10 DIAGNOSIS — R30.0 DYSURIA: ICD-10-CM

## 2024-12-10 DIAGNOSIS — R31.9 HEMATURIA, UNSPECIFIED TYPE: ICD-10-CM

## 2024-12-11 DIAGNOSIS — N18.32 TYPE 2 DIABETES MELLITUS WITH STAGE 3B CHRONIC KIDNEY DISEASE, WITH LONG-TERM CURRENT USE OF INSULIN (HCC): ICD-10-CM

## 2024-12-11 DIAGNOSIS — Z79.4 TYPE 2 DIABETES MELLITUS WITH STAGE 3B CHRONIC KIDNEY DISEASE, WITH LONG-TERM CURRENT USE OF INSULIN (HCC): ICD-10-CM

## 2024-12-11 DIAGNOSIS — E11.22 TYPE 2 DIABETES MELLITUS WITH STAGE 3B CHRONIC KIDNEY DISEASE, WITH LONG-TERM CURRENT USE OF INSULIN (HCC): ICD-10-CM

## 2024-12-11 RX ORDER — LISINOPRIL 40 MG/1
40 TABLET ORAL DAILY
Qty: 90 TABLET | Refills: 3 | Status: SHIPPED | OUTPATIENT
Start: 2024-12-11

## 2024-12-11 RX ORDER — CLONIDINE HYDROCHLORIDE 0.2 MG/1
0.2 TABLET ORAL 2 TIMES DAILY
Qty: 180 TABLET | Refills: 3 | Status: SHIPPED | OUTPATIENT
Start: 2024-12-11

## 2024-12-11 RX ORDER — AMLODIPINE BESYLATE 10 MG/1
10 TABLET ORAL DAILY
Qty: 90 TABLET | Refills: 3 | Status: SHIPPED | OUTPATIENT
Start: 2024-12-11

## 2024-12-11 RX ORDER — ATORVASTATIN CALCIUM 80 MG/1
80 TABLET, FILM COATED ORAL DAILY
Qty: 90 TABLET | Refills: 3 | Status: SHIPPED | OUTPATIENT
Start: 2024-12-11

## 2024-12-11 RX ORDER — CARVEDILOL 25 MG/1
25 TABLET ORAL 2 TIMES DAILY
Qty: 60 TABLET | Refills: 3 | Status: SHIPPED | OUTPATIENT
Start: 2024-12-11

## 2024-12-11 RX ORDER — INSULIN GLARGINE 100 [IU]/ML
10 INJECTION, SOLUTION SUBCUTANEOUS NIGHTLY
Qty: 9 ML | Refills: 1 | Status: SHIPPED | OUTPATIENT
Start: 2024-12-11

## 2024-12-11 RX ORDER — GLIPIZIDE 5 MG/1
5 TABLET ORAL 2 TIMES DAILY
Qty: 120 TABLET | Refills: 1 | Status: SHIPPED | OUTPATIENT
Start: 2024-12-11

## 2024-12-13 LAB
PSA SERPL-MCNC: 2 NG/ML (ref 0–4)
REFLEX CRITERIA: NORMAL

## 2024-12-16 RX ORDER — INSULIN GLARGINE 100 [IU]/ML
10 INJECTION, SOLUTION SUBCUTANEOUS NIGHTLY
Refills: 1 | OUTPATIENT
Start: 2024-12-16

## 2024-12-18 DIAGNOSIS — R31.9 HEMATURIA, UNSPECIFIED TYPE: ICD-10-CM

## 2024-12-18 DIAGNOSIS — R30.0 DYSURIA: ICD-10-CM

## 2024-12-18 LAB
BACTERIA URNS QL MICRO: ABNORMAL /HPF
EPITH CASTS URNS QL MICRO: ABNORMAL /LPF
HYALINE CASTS URNS QL MICRO: ABNORMAL /LPF (ref 0–5)
RBC #/AREA URNS HPF: ABNORMAL /HPF (ref 0–5)
WBC URNS QL MICRO: ABNORMAL /HPF (ref 0–4)

## 2024-12-19 RX ORDER — CEFDINIR 300 MG/1
300 CAPSULE ORAL 2 TIMES DAILY
Qty: 20 CAPSULE | Refills: 0 | Status: SHIPPED | OUTPATIENT
Start: 2024-12-19 | End: 2024-12-29

## 2024-12-20 LAB
BACTERIA SPEC CULT: ABNORMAL
CC UR VC: ABNORMAL
SERVICE CMNT-IMP: ABNORMAL

## 2024-12-30 ENCOUNTER — TELEPHONE (OUTPATIENT)
Age: 59
End: 2024-12-30

## 2024-12-30 NOTE — TELEPHONE ENCOUNTER
Lucy from [facility] called stating that the received orders from this patient, but they were signed by Dr. Angelo and he is not pecos certified. She stated that the orders were needing to be signed and sent back again with Dr. Zamora' signature. Forms have been received and scanned into patient's chart and they will also be placed in the nurse's folder.    Thanks!

## 2025-01-12 DIAGNOSIS — K59.00 CONSTIPATION, UNSPECIFIED CONSTIPATION TYPE: ICD-10-CM

## 2025-01-13 ENCOUNTER — TELEPHONE (OUTPATIENT)
Age: 60
End: 2025-01-13

## 2025-01-13 NOTE — TELEPHONE ENCOUNTER
Spoke with Chayo (wife) who identified patient with two patient identifiers (name and ).    On Release of Information Form? Yes (24)  Call Back Number: [phone]    Wife stated that patient was having difficulty swallowing yesterday and that patient reports biting his tongue yesterday. Considering that he has had TIAs in the past, she is asking for advice.    Wife stated that patient is currently napping upstairs during call and that patient has been able to walk, speak, and swallow at baseline.    This nurse informed wife that office has does not have ability to rule out TIA/Stroke and that patient would have to go to the Emergency Room for rule out. Wife verbalized understanding and stated that patient refused to go to the emergency room.    This nurse verbalized understanding with wife; appointment scheduled below. This nurse also advised wife that if anything changes or any worsening of symptoms to go to the nearest emergency room or call 911.    Patient's wife agreed and voiced understanding to advice provided with opportunity for questions/concerns. No further questions or concerns at this time.     Future Appointments   Date Time Provider Department Center   2025  5:50 PM Laina Leo DO [facility] DEP   2/3/2025  8:30 PM BEDROOM [address] [place] [address] Sleep Lab    3/13/2025  2:20 PM Tato Angelo DO [facility] DEP        Eran Guaman RN

## 2025-01-13 NOTE — TELEPHONE ENCOUNTER
Pt. cannot eat w/ decreased verbalization. Refused to go to ER - told wife he just bit his tongue. He has had mini strokes in the past - looking for advice on how to proceed.

## 2025-01-16 RX ORDER — DOCUSATE SODIUM 100 MG/1
100 CAPSULE, LIQUID FILLED ORAL 2 TIMES DAILY
Qty: 60 CAPSULE | Refills: 1 | Status: SHIPPED | OUTPATIENT
Start: 2025-01-16

## 2025-03-11 DIAGNOSIS — N18.32 TYPE 2 DIABETES MELLITUS WITH STAGE 3B CHRONIC KIDNEY DISEASE, WITH LONG-TERM CURRENT USE OF INSULIN (HCC): ICD-10-CM

## 2025-03-11 DIAGNOSIS — I10 ESSENTIAL (PRIMARY) HYPERTENSION: ICD-10-CM

## 2025-03-11 DIAGNOSIS — Z79.4 TYPE 2 DIABETES MELLITUS WITH STAGE 3B CHRONIC KIDNEY DISEASE, WITH LONG-TERM CURRENT USE OF INSULIN (HCC): ICD-10-CM

## 2025-03-11 DIAGNOSIS — E11.22 TYPE 2 DIABETES MELLITUS WITH STAGE 3B CHRONIC KIDNEY DISEASE, WITH LONG-TERM CURRENT USE OF INSULIN (HCC): ICD-10-CM

## 2025-03-12 NOTE — TELEPHONE ENCOUNTER
Medication Refill Request    Jaison Ray is requesting a refill of the following medication(s):   Requested Prescriptions     Pending Prescriptions Disp Refills    glipiZIDE (GLUCOTROL) 5 MG tablet [Pharmacy Med Name: GLIPIZIDE 5 MG TABLET] 180 tablet 1     Sig: TAKE 1 TABLET BY MOUTH TWICE A DAY    carvedilol (COREG) 25 MG tablet [Pharmacy Med Name: CARVEDILOL 25 MG TABLET] 180 tablet 1     Sig: TAKE 1 TABLET BY MOUTH TWICE A DAY        Listed PCP is Tato Angelo DO   Last provider to prescribe medication: Dr. Angelo on 12/11/24  Date of Last Office Visit at [facility]: 12/9/2024 with Dr. Angelo    FUTURE [facility] APPOINTMENT: 3/13/2025    Please send refill to:    [id number] - [place], [place] - [id number] [place] [address] - P [phone] - F [phone]  [address] [place] [address]  Phone: [phone] Fax: [phone]      Please review request and approve or deny with recommendations within 48 hours.

## 2025-03-14 RX ORDER — CARVEDILOL 25 MG/1
25 TABLET ORAL 2 TIMES DAILY
Qty: 180 TABLET | Refills: 1 | Status: SHIPPED | OUTPATIENT
Start: 2025-03-14

## 2025-03-14 RX ORDER — GLIPIZIDE 5 MG/1
5 TABLET ORAL 2 TIMES DAILY
Qty: 180 TABLET | Refills: 1 | Status: SHIPPED | OUTPATIENT
Start: 2025-03-14

## 2025-03-23 DIAGNOSIS — K59.00 CONSTIPATION, UNSPECIFIED CONSTIPATION TYPE: ICD-10-CM

## 2025-04-01 ENCOUNTER — OFFICE VISIT (OUTPATIENT)
Age: 60
End: 2025-04-01

## 2025-04-01 VITALS
SYSTOLIC BLOOD PRESSURE: 159 MMHG | BODY MASS INDEX: 28.79 KG/M2 | TEMPERATURE: 98.2 F | HEART RATE: 70 BPM | DIASTOLIC BLOOD PRESSURE: 80 MMHG | OXYGEN SATURATION: 95 % | WEIGHT: 173 LBS

## 2025-04-01 DIAGNOSIS — I10 ESSENTIAL (PRIMARY) HYPERTENSION: ICD-10-CM

## 2025-04-01 DIAGNOSIS — Z79.4 TYPE 2 DIABETES MELLITUS WITH STAGE 3B CHRONIC KIDNEY DISEASE, WITH LONG-TERM CURRENT USE OF INSULIN (HCC): Primary | ICD-10-CM

## 2025-04-01 DIAGNOSIS — E11.22 TYPE 2 DIABETES MELLITUS WITH STAGE 3B CHRONIC KIDNEY DISEASE, WITH LONG-TERM CURRENT USE OF INSULIN (HCC): Primary | ICD-10-CM

## 2025-04-01 DIAGNOSIS — K59.00 CONSTIPATION, UNSPECIFIED CONSTIPATION TYPE: ICD-10-CM

## 2025-04-01 DIAGNOSIS — R39.9 URINARY TRACT INFECTION SYMPTOMS: ICD-10-CM

## 2025-04-01 DIAGNOSIS — N39.43 POST-VOID DRIBBLING: ICD-10-CM

## 2025-04-01 DIAGNOSIS — N18.32 TYPE 2 DIABETES MELLITUS WITH STAGE 3B CHRONIC KIDNEY DISEASE, WITH LONG-TERM CURRENT USE OF INSULIN (HCC): Primary | ICD-10-CM

## 2025-04-01 LAB
BILIRUBIN, URINE, POC: NEGATIVE
BLOOD URINE, POC: NORMAL
GLUCOSE URINE, POC: NEGATIVE
KETONES, URINE, POC: NEGATIVE
LEUKOCYTE ESTERASE, URINE, POC: NORMAL
NITRITE, URINE, POC: POSITIVE
PH, URINE, POC: 6 (ref 4.6–8)
PROTEIN,URINE, POC: NORMAL
SPECIFIC GRAVITY, URINE, POC: 1.02 (ref 1–1.03)
URINALYSIS CLARITY, POC: NORMAL
URINALYSIS COLOR, POC: YELLOW
UROBILINOGEN, POC: NORMAL

## 2025-04-01 PROCEDURE — 99213 OFFICE O/P EST LOW 20 MIN: CPT

## 2025-04-01 PROCEDURE — 81003 URINALYSIS AUTO W/O SCOPE: CPT

## 2025-04-01 PROCEDURE — 3079F DIAST BP 80-89 MM HG: CPT

## 2025-04-01 PROCEDURE — 3077F SYST BP >= 140 MM HG: CPT

## 2025-04-01 PROCEDURE — PBSHW AMB POC URINALYSIS DIP STICK AUTO W/O MICRO

## 2025-04-01 RX ORDER — DAPAGLIFLOZIN 10 MG/1
10 TABLET, FILM COATED ORAL DAILY
Qty: 30 TABLET | Status: CANCELLED | OUTPATIENT
Start: 2025-04-01

## 2025-04-01 RX ORDER — CEFDINIR 300 MG/1
300 CAPSULE ORAL 2 TIMES DAILY
Qty: 20 CAPSULE | Refills: 0 | Status: SHIPPED | OUTPATIENT
Start: 2025-04-01 | End: 2025-04-11

## 2025-04-01 RX ORDER — DOCUSATE SODIUM 100 MG/1
100 CAPSULE, LIQUID FILLED ORAL 2 TIMES DAILY
Qty: 60 CAPSULE | Refills: 1 | Status: SHIPPED | OUTPATIENT
Start: 2025-04-01

## 2025-04-01 NOTE — PROGRESS NOTES
[address] [place] [address]   Office [phone], Fax [phone]   Subjective     Chief Complaint   Patient presents with    Follow-up Chronic Condition      Jaison Ray is a 60 y.o. male who presents for the above.    - Wife reports two concerning fasting sugar levels: Friday 291, Saturday 297. Normally ranging in the 100's  - Lantus 10 units, Glipizide 5mg twice daily  - Off farxiga over the past couple months. Was unable to get refill  - Seeing cardiologist 4/3. Did heart monitor and is awaiting results    Past Medical History  Past Medical History:   Diagnosis Date    Diabetes mellitus (HCC)     Hyperlipidemia     Hypertension     Stroke (HCC)      Current Medications  Current Outpatient Medications   Medication Sig Dispense Refill    docusate sodium (COLACE) 100 MG capsule Take 1 capsule by mouth 2 times daily 60 capsule 1    cefdinir (OMNICEF) 300 MG capsule Take 1 capsule by mouth 2 times daily for 10 days 20 capsule 0    glipiZIDE (GLUCOTROL) 5 MG tablet TAKE 1 TABLET BY MOUTH TWICE A  tablet 1    carvedilol (COREG) 25 MG tablet TAKE 1 TABLET BY MOUTH TWICE A  tablet 1    cloNIDine (CATAPRES) 0.2 MG tablet Take 1 tablet by mouth 2 times daily 180 tablet 3    atorvastatin (LIPITOR) 80 MG tablet Take 1 tablet by mouth daily 90 tablet 3    amLODIPine (NORVASC) 10 MG tablet Take 1 tablet by mouth daily 90 tablet 3    sertraline (ZOLOFT) 50 MG tablet Take 1 tablet by mouth daily 90 tablet 3    lisinopril (PRINIVIL;ZESTRIL) 40 MG tablet Take 1 tablet by mouth daily 90 tablet 3    ASPIRIN LOW DOSE 81 MG EC tablet Take 1 tablet by mouth daily      clopidogrel (PLAVIX) 75 MG tablet Take 1 tablet by mouth daily      insulin glargine (LANTUS SOLOSTAR) 100 UNIT/ML injection pen Inject 10 Units into the skin nightly 9 mL 1    FARXIGA 10 MG tablet Take 1 tablet by mouth daily      Insulin Pen Needle ([employer]ER PEN NEEDLES) 31G X 6 MM MISC 1 each by Does not apply route daily 100 each 3

## 2025-04-01 NOTE — PROGRESS NOTES
Identified pt with two pt identifiers(name and ). Reviewed record in preparation for visit and have obtained necessary documentation.  Chief Complaint   Patient presents with    Follow-up Chronic Condition        Vitals:    25 1633   BP: (!) 159/80   Pulse: 70   Temp: 98.2 °F (36.8 °C)   TempSrc: Oral   SpO2: 95%   Weight: 78.5 kg (173 lb)         Coordination of Care Questionnaire:  :     \"Have you been to the ER, urgent care clinic since your last visit?  Hospitalized since your last visit?\"    NO    “Have you seen or consulted any other health care providers outside of [facility] since your last visit?”    NO      “Have you had a colorectal cancer screening such as a colonoscopy/FIT/Cologuard?    NO    No colonoscopy on file  No cologuard on file  No FIT/FOBT on file   No flexible sigmoidoscopy on file         Click Here for Release of Records Request

## 2025-04-03 ENCOUNTER — LAB (OUTPATIENT)
Age: 60
End: 2025-04-03

## 2025-04-03 DIAGNOSIS — N18.32 TYPE 2 DIABETES MELLITUS WITH STAGE 3B CHRONIC KIDNEY DISEASE, WITH LONG-TERM CURRENT USE OF INSULIN (HCC): ICD-10-CM

## 2025-04-03 DIAGNOSIS — Z79.4 TYPE 2 DIABETES MELLITUS WITH STAGE 3B CHRONIC KIDNEY DISEASE, WITH LONG-TERM CURRENT USE OF INSULIN (HCC): ICD-10-CM

## 2025-04-03 DIAGNOSIS — E11.22 TYPE 2 DIABETES MELLITUS WITH STAGE 3B CHRONIC KIDNEY DISEASE, WITH LONG-TERM CURRENT USE OF INSULIN (HCC): ICD-10-CM

## 2025-04-04 LAB
ALBUMIN SERPL-MCNC: 3.6 G/DL (ref 3.5–5)
ALBUMIN/GLOB SERPL: 0.9 (ref 1.1–2.2)
ALP SERPL-CCNC: 111 U/L (ref 45–117)
ALT SERPL-CCNC: 36 U/L (ref 12–78)
ANION GAP SERPL CALC-SCNC: 2 MMOL/L (ref 2–12)
AST SERPL-CCNC: 20 U/L (ref 15–37)
BILIRUB SERPL-MCNC: 0.4 MG/DL (ref 0.2–1)
BUN SERPL-MCNC: 38 MG/DL (ref 6–20)
BUN/CREAT SERPL: 22 (ref 12–20)
CALCIUM SERPL-MCNC: 8.4 MG/DL (ref 8.5–10.1)
CHLORIDE SERPL-SCNC: 106 MMOL/L (ref 97–108)
CO2 SERPL-SCNC: 28 MMOL/L (ref 21–32)
CREAT SERPL-MCNC: 1.69 MG/DL (ref 0.7–1.3)
ERYTHROCYTE [DISTWIDTH] IN BLOOD BY AUTOMATED COUNT: 13.2 % (ref 11.5–14.5)
EST. AVERAGE GLUCOSE BLD GHB EST-MCNC: 194 MG/DL
GLOBULIN SER CALC-MCNC: 3.9 G/DL (ref 2–4)
GLUCOSE SERPL-MCNC: 319 MG/DL (ref 65–100)
HBA1C MFR BLD: 8.4 % (ref 4–5.6)
HCT VFR BLD AUTO: 30.3 % (ref 36.6–50.3)
HGB BLD-MCNC: 9.9 G/DL (ref 12.1–17)
MCH RBC QN AUTO: 27.8 PG (ref 26–34)
MCHC RBC AUTO-ENTMCNC: 32.7 G/DL (ref 30–36.5)
MCV RBC AUTO: 85.1 FL (ref 80–99)
NRBC # BLD: 0 K/UL (ref 0–0.01)
NRBC BLD-RTO: 0 PER 100 WBC
PLATELET # BLD AUTO: 198 K/UL (ref 150–400)
PMV BLD AUTO: 11.2 FL (ref 8.9–12.9)
POTASSIUM SERPL-SCNC: 4.3 MMOL/L (ref 3.5–5.1)
PROT SERPL-MCNC: 7.5 G/DL (ref 6.4–8.2)
RBC # BLD AUTO: 3.56 M/UL (ref 4.1–5.7)
SODIUM SERPL-SCNC: 136 MMOL/L (ref 136–145)
WBC # BLD AUTO: 6.6 K/UL (ref 4.1–11.1)

## 2025-04-04 NOTE — PROGRESS NOTES
[facility] Attending Attestation: While the patient was in clinic or immediately following the patient leaving the clinic, I reviewed the patient's medical history, the resident's findings on physical examination, and the patient's diagnosis and treatment plan with the resident and agree with the documentation in the note.     Mikey Claire MD

## 2025-04-07 RX ORDER — DOCUSATE SODIUM 100 MG
100 CAPSULE ORAL 2 TIMES DAILY
Qty: 60 CAPSULE | Refills: 1 | OUTPATIENT
Start: 2025-04-07

## 2025-04-08 ENCOUNTER — RESULTS FOLLOW-UP (OUTPATIENT)
Age: 60
End: 2025-04-08

## 2025-04-08 DIAGNOSIS — Z79.4 TYPE 2 DIABETES MELLITUS WITH STAGE 3B CHRONIC KIDNEY DISEASE, WITH LONG-TERM CURRENT USE OF INSULIN (HCC): Primary | ICD-10-CM

## 2025-04-08 DIAGNOSIS — E11.22 TYPE 2 DIABETES MELLITUS WITH STAGE 3B CHRONIC KIDNEY DISEASE, WITH LONG-TERM CURRENT USE OF INSULIN (HCC): Primary | ICD-10-CM

## 2025-04-08 DIAGNOSIS — N18.32 TYPE 2 DIABETES MELLITUS WITH STAGE 3B CHRONIC KIDNEY DISEASE, WITH LONG-TERM CURRENT USE OF INSULIN (HCC): Primary | ICD-10-CM

## 2025-04-08 RX ORDER — GLIPIZIDE 10 MG/1
10 TABLET ORAL 2 TIMES DAILY
Qty: 60 TABLET | Refills: 3 | OUTPATIENT
Start: 2025-04-08

## 2025-05-06 ENCOUNTER — TELEPHONE (OUTPATIENT)
Age: 60
End: 2025-05-06

## 2025-05-10 DIAGNOSIS — K59.00 CONSTIPATION, UNSPECIFIED CONSTIPATION TYPE: ICD-10-CM

## 2025-05-13 RX ORDER — DOCUSATE SODIUM 100 MG/1
100 CAPSULE, LIQUID FILLED ORAL 2 TIMES DAILY
Qty: 60 CAPSULE | Refills: 1 | Status: SHIPPED | OUTPATIENT
Start: 2025-05-13

## 2025-05-13 NOTE — TELEPHONE ENCOUNTER
Medication Refill Request    Jaison Ray is requesting a refill of the following medication(s):   Requested Prescriptions     Pending Prescriptions Disp Refills    docusate sodium (COLACE) 100 MG capsule [Pharmacy Med Name: DOCUSATE SODIUM 100 MG SOFTGEL] 60 capsule 1     Sig: TAKE 1 CAPSULE BY MOUTH TWICE A DAY        Listed PCP is Tato Angelo DO   Last provider to prescribe medication: Tato Angelo DO   Last Date of Medication Prescribed: 04/01/2025   Date of Last Office Visit at [facility]: 04/01/2025   FUTURE APPOINTMENT: No future appointments.    Please send refill to:    [facility]/pharm[facility] #[id number] - [place], [place] - [id number] [place] [address] -  [phone] - F [phone]  [address] [place] [address]  Phone: [phone] Fax: [phone]      Please review request and approve or deny with recommendations.

## 2025-05-22 ENCOUNTER — TELEPHONE (OUTPATIENT)
Age: 60
End: 2025-05-22

## 2025-05-22 DIAGNOSIS — E11.3533: ICD-10-CM

## 2025-05-22 DIAGNOSIS — R32 URINARY INCONTINENCE, UNSPECIFIED TYPE: Primary | ICD-10-CM

## 2025-05-22 DIAGNOSIS — Z86.73 HISTORY OF CVA (CEREBROVASCULAR ACCIDENT): ICD-10-CM

## 2025-05-22 RX ORDER — PEN NEEDLE, DIABETIC 32GX 5/32"
1 NEEDLE, DISPOSABLE MISCELLANEOUS 3 TIMES DAILY PRN
Qty: 48 EACH | Refills: 5 | Status: SHIPPED | OUTPATIENT
Start: 2025-05-22

## 2025-05-22 RX ORDER — PEN NEEDLE, DIABETIC 31 G X1/4"
1 NEEDLE, DISPOSABLE MISCELLANEOUS DAILY
Qty: 100 EACH | Refills: 3 | Status: SHIPPED | OUTPATIENT
Start: 2025-05-22

## 2025-05-22 NOTE — TELEPHONE ENCOUNTER
Patient states pharmacy needs an attendings name on the prescription  Insulin Pen Needle ([employer] PEN NEEDLES) 31G X 6 MM MISC

## 2025-05-28 ENCOUNTER — TELEPHONE (OUTPATIENT)
Age: 60
End: 2025-05-28

## 2025-06-13 ENCOUNTER — TELEPHONE (OUTPATIENT)
Age: 60
End: 2025-06-13

## 2025-06-13 NOTE — TELEPHONE ENCOUNTER
Salina from [facility] checking on faxed certificate of medical necessity/DME request for Adult size Pull On/incontinence supplies sent to us on 5/29 and again on 6/6. She is going tor refax. Please have the doctor sign and return.

## 2025-06-16 ENCOUNTER — OFFICE VISIT (OUTPATIENT)
Age: 60
End: 2025-06-16
Payer: COMMERCIAL

## 2025-06-16 VITALS
BODY MASS INDEX: 27.82 KG/M2 | SYSTOLIC BLOOD PRESSURE: 112 MMHG | HEIGHT: 65 IN | DIASTOLIC BLOOD PRESSURE: 63 MMHG | WEIGHT: 167 LBS | OXYGEN SATURATION: 96 % | TEMPERATURE: 98.2 F | HEART RATE: 69 BPM | RESPIRATION RATE: 18 BRPM

## 2025-06-16 DIAGNOSIS — N18.32 TYPE 2 DIABETES MELLITUS WITH STAGE 3B CHRONIC KIDNEY DISEASE, WITH LONG-TERM CURRENT USE OF INSULIN (HCC): ICD-10-CM

## 2025-06-16 DIAGNOSIS — Z79.4 TYPE 2 DIABETES MELLITUS WITH STAGE 3B CHRONIC KIDNEY DISEASE, WITH LONG-TERM CURRENT USE OF INSULIN (HCC): ICD-10-CM

## 2025-06-16 DIAGNOSIS — N18.31 STAGE 3A CHRONIC KIDNEY DISEASE (HCC): ICD-10-CM

## 2025-06-16 DIAGNOSIS — E11.3533: ICD-10-CM

## 2025-06-16 DIAGNOSIS — E11.22 TYPE 2 DIABETES MELLITUS WITH STAGE 3B CHRONIC KIDNEY DISEASE, WITH LONG-TERM CURRENT USE OF INSULIN (HCC): ICD-10-CM

## 2025-06-16 DIAGNOSIS — D64.9 ANEMIA, UNSPECIFIED TYPE: ICD-10-CM

## 2025-06-16 DIAGNOSIS — Z12.11 ENCOUNTER FOR SCREENING COLONOSCOPY: ICD-10-CM

## 2025-06-16 DIAGNOSIS — I10 HYPERTENSION, UNSPECIFIED TYPE: Primary | ICD-10-CM

## 2025-06-16 PROCEDURE — 99213 OFFICE O/P EST LOW 20 MIN: CPT | Performed by: STUDENT IN AN ORGANIZED HEALTH CARE EDUCATION/TRAINING PROGRAM

## 2025-06-16 PROCEDURE — 3074F SYST BP LT 130 MM HG: CPT | Performed by: STUDENT IN AN ORGANIZED HEALTH CARE EDUCATION/TRAINING PROGRAM

## 2025-06-16 PROCEDURE — 3078F DIAST BP <80 MM HG: CPT | Performed by: STUDENT IN AN ORGANIZED HEALTH CARE EDUCATION/TRAINING PROGRAM

## 2025-06-16 PROCEDURE — 3052F HG A1C>EQUAL 8.0%<EQUAL 9.0%: CPT | Performed by: STUDENT IN AN ORGANIZED HEALTH CARE EDUCATION/TRAINING PROGRAM

## 2025-06-16 RX ORDER — INSULIN GLARGINE 100 [IU]/ML
12 INJECTION, SOLUTION SUBCUTANEOUS NIGHTLY
Qty: 9 ML | Refills: 1 | Status: SHIPPED | OUTPATIENT
Start: 2025-06-16

## 2025-06-16 RX ORDER — TAMSULOSIN HYDROCHLORIDE 0.4 MG/1
CAPSULE ORAL DAILY
COMMUNITY
Start: 2025-06-04

## 2025-06-16 SDOH — ECONOMIC STABILITY: FOOD INSECURITY: WITHIN THE PAST 12 MONTHS, YOU WORRIED THAT YOUR FOOD WOULD RUN OUT BEFORE YOU GOT MONEY TO BUY MORE.: NEVER TRUE

## 2025-06-16 SDOH — ECONOMIC STABILITY: FOOD INSECURITY: WITHIN THE PAST 12 MONTHS, THE FOOD YOU BOUGHT JUST DIDN'T LAST AND YOU DIDN'T HAVE MONEY TO GET MORE.: NEVER TRUE

## 2025-06-16 ASSESSMENT — PATIENT HEALTH QUESTIONNAIRE - PHQ9
8. MOVING OR SPEAKING SO SLOWLY THAT OTHER PEOPLE COULD HAVE NOTICED. OR THE OPPOSITE, BEING SO FIGETY OR RESTLESS THAT YOU HAVE BEEN MOVING AROUND A LOT MORE THAN USUAL: NEARLY EVERY DAY
SUM OF ALL RESPONSES TO PHQ QUESTIONS 1-9: 16
9. THOUGHTS THAT YOU WOULD BE BETTER OFF DEAD, OR OF HURTING YOURSELF: NOT AT ALL
10. IF YOU CHECKED OFF ANY PROBLEMS, HOW DIFFICULT HAVE THESE PROBLEMS MADE IT FOR YOU TO DO YOUR WORK, TAKE CARE OF THINGS AT HOME, OR GET ALONG WITH OTHER PEOPLE: VERY DIFFICULT
1. LITTLE INTEREST OR PLEASURE IN DOING THINGS: MORE THAN HALF THE DAYS
SUM OF ALL RESPONSES TO PHQ QUESTIONS 1-9: 16
4. FEELING TIRED OR HAVING LITTLE ENERGY: NEARLY EVERY DAY
SUM OF ALL RESPONSES TO PHQ QUESTIONS 1-9: 16
3. TROUBLE FALLING OR STAYING ASLEEP: NEARLY EVERY DAY
5. POOR APPETITE OR OVEREATING: NOT AT ALL
2. FEELING DOWN, DEPRESSED OR HOPELESS: MORE THAN HALF THE DAYS
SUM OF ALL RESPONSES TO PHQ QUESTIONS 1-9: 16
7. TROUBLE CONCENTRATING ON THINGS, SUCH AS READING THE NEWSPAPER OR WATCHING TELEVISION: NEARLY EVERY DAY
6. FEELING BAD ABOUT YOURSELF - OR THAT YOU ARE A FAILURE OR HAVE LET YOURSELF OR YOUR FAMILY DOWN: NOT AT ALL

## 2025-06-16 NOTE — PROGRESS NOTES
Jaison Ray is a 60 y.o. male    Identified pt with two pt identifiers(name and ). Reviewed record in preparation for visit and have obtained necessary documentation.    Chief Complaint   Patient presents with    Blood Pressure Check     Patient's blood pressure has been running high and then super low. His blood sugars have been running high. A1C was 8.1. This was done by the nurse from the insurance. No other concerns.        \"Have you been to the ER, urgent care clinic since your last visit?  Hospitalized since your last visit?\"    NO    “Have you seen or consulted any other health care providers outside of [facility] since your last visit?”    NO           Vitals:    25 1315   BP: 112/63   BP Site: Right Upper Arm   Patient Position: Sitting   BP Cuff Size: Small Adult   Pulse: 69   Resp: 18   Temp: 98.2 °F (36.8 °C)   TempSrc: Oral   SpO2: 96%   Weight: 75.8 kg (167 lb)   Height: 1.651 m (5' 5\")            Health Maintenance Due   Topic Date Due    HIV screen  Never done    Diabetic retinal exam  Never done    Hepatitis C screen  Never done    Pneumococcal 50+ years Vaccine (1 of 2 - PCV) Never done    Colorectal Cancer Screen  Never done    Shingles vaccine (1 of 2) Never done    COVID-19 Vaccine (2 - - season) 2024    Respiratory Syncytial Virus (RSV) Pregnant or age 60 yrs+ (1 - Risk 60-74 years 1-dose series) Never done    Diabetic Alb to Cr ratio (uACR) test  2025    Lipids  2025       Click Here for Release of Records Request     Medication Reconciliation completed, changes noted.  Please  Update medication list.

## 2025-06-16 NOTE — PROGRESS NOTES
[facility]   [facility]      Chief Complaint:   Chief Complaint   Patient presents with    Blood Pressure Check     Patient's blood pressure has been running high and then super low. His blood sugars have been running high. A1C was 8.1. This was done by the nurse from the insurance. No other concerns.        SUBJECTIVE:  Jaison Ray is a 60 y.o. male who presents for follow up of HTN and DM.  States overall feeling well today.     HTN:   - On carvedilol 25mg, clonidine 0.2mg, amlodipine 10mg, lisinopril 40mg. Reports BP at home ranging from 90s and 170s systolic. States feeling really tired when BP is low.     DM  - States finger stick glucose at home in 230s. Had A1c checked last week by nurse that visited his house and was told it was 8.1. Insulin glargine 10 units every night, glipizide 5mg.     Anxiety/Depression:  - Describes feeling anxious and depressed about his current health conditions and states feeling worried about dying. Currently on Zoloft 50mg. States speaking to a therapist in the past but stopped going since it wasn't helping. Denies SI or HI.     Health maintenance:  - Colonscopy: has not had before.   - Ophthalmology: seen 1 week ago. Dr. Sierra at [facility].   - Podiatry: Seen by Dr. Josh Lopez, last seen 1 week ago.     PMHx:  Past Medical History:   Diagnosis Date    Diabetes mellitus (HCC)     Hyperlipidemia     Hypertension     Sleep apnea [id number]    Stroke ([facility])        Meds:   Current Outpatient Medications   Medication Sig Dispense Refill    tamsulosin (FLOMAX) 0.4 MG capsule Take by mouth daily      insulin glargine (LANTUS SOLOSTAR) 100 UNIT/ML injection pen Inject 12 Units into the skin nightly 9 mL 1    Insulin Pen Needle 32G X 6 MM MISC For use with insulin 100 each 3    Incontinence Supply Disposable (COMFORT SHIELD ADULT DIAPERS) MISC 1 each by Does not apply route 3 times daily as needed

## 2025-06-17 LAB
ANION GAP SERPL CALC-SCNC: 5 MMOL/L (ref 2–12)
BASOPHILS # BLD: 0.02 K/UL (ref 0–0.1)
BASOPHILS NFR BLD: 0.3 % (ref 0–1)
BUN SERPL-MCNC: 45 MG/DL (ref 6–20)
BUN/CREAT SERPL: 23 (ref 12–20)
CALCIUM SERPL-MCNC: 9 MG/DL (ref 8.5–10.1)
CHLORIDE SERPL-SCNC: 108 MMOL/L (ref 97–108)
CO2 SERPL-SCNC: 25 MMOL/L (ref 21–32)
CREAT SERPL-MCNC: 1.95 MG/DL (ref 0.7–1.3)
DIFFERENTIAL METHOD BLD: ABNORMAL
EOSINOPHIL # BLD: 0.13 K/UL (ref 0–0.4)
EOSINOPHIL NFR BLD: 2 % (ref 0–7)
ERYTHROCYTE [DISTWIDTH] IN BLOOD BY AUTOMATED COUNT: 12.2 % (ref 11.5–14.5)
FERRITIN SERPL-MCNC: 141 NG/ML (ref 26–388)
GLUCOSE SERPL-MCNC: 302 MG/DL (ref 65–100)
HCT VFR BLD AUTO: 31.1 % (ref 36.6–50.3)
HGB BLD-MCNC: 9.7 G/DL (ref 12.1–17)
IMM GRANULOCYTES # BLD AUTO: 0.02 K/UL (ref 0–0.04)
IMM GRANULOCYTES NFR BLD AUTO: 0.3 % (ref 0–0.5)
IRON SATN MFR SERPL: 26 % (ref 20–50)
IRON SERPL-MCNC: 61 UG/DL (ref 35–150)
LYMPHOCYTES # BLD: 0.9 K/UL (ref 0.8–3.5)
LYMPHOCYTES NFR BLD: 14.2 % (ref 12–49)
MCH RBC QN AUTO: 27.8 PG (ref 26–34)
MCHC RBC AUTO-ENTMCNC: 31.2 G/DL (ref 30–36.5)
MCV RBC AUTO: 89.1 FL (ref 80–99)
MONOCYTES # BLD: 0.51 K/UL (ref 0–1)
MONOCYTES NFR BLD: 8 % (ref 5–13)
NEUTS SEG # BLD: 4.77 K/UL (ref 1.8–8)
NEUTS SEG NFR BLD: 75.2 % (ref 32–75)
NRBC # BLD: 0 K/UL (ref 0–0.01)
NRBC BLD-RTO: 0 PER 100 WBC
PLATELET # BLD AUTO: 207 K/UL (ref 150–400)
PMV BLD AUTO: 11.4 FL (ref 8.9–12.9)
POTASSIUM SERPL-SCNC: 4.5 MMOL/L (ref 3.5–5.1)
RBC # BLD AUTO: 3.49 M/UL (ref 4.1–5.7)
SODIUM SERPL-SCNC: 138 MMOL/L (ref 136–145)
TIBC SERPL-MCNC: 235 UG/DL (ref 250–450)
WBC # BLD AUTO: 6.4 K/UL (ref 4.1–11.1)

## 2025-06-18 ENCOUNTER — TELEPHONE (OUTPATIENT)
Age: 60
End: 2025-06-18

## 2025-06-18 NOTE — TELEPHONE ENCOUNTER
Cristina with [facility] requesting demographic sheet, last 2 office visit notes and recent labs faxed to [phone]. Patient has contacted them to get scheduled. These documents were faxed by this PSR. A successful fax confirmation was received.

## 2025-07-01 ENCOUNTER — OFFICE VISIT (OUTPATIENT)
Age: 60
End: 2025-07-01
Payer: COMMERCIAL

## 2025-07-01 VITALS
OXYGEN SATURATION: 96 % | HEART RATE: 74 BPM | TEMPERATURE: 99 F | DIASTOLIC BLOOD PRESSURE: 76 MMHG | BODY MASS INDEX: 27.26 KG/M2 | HEIGHT: 65 IN | RESPIRATION RATE: 15 BRPM | WEIGHT: 163.6 LBS | SYSTOLIC BLOOD PRESSURE: 130 MMHG

## 2025-07-01 DIAGNOSIS — Z86.73 HISTORY OF CVA (CEREBROVASCULAR ACCIDENT): ICD-10-CM

## 2025-07-01 DIAGNOSIS — K59.00 CONSTIPATION, UNSPECIFIED CONSTIPATION TYPE: ICD-10-CM

## 2025-07-01 DIAGNOSIS — I10 HYPERTENSION, UNSPECIFIED TYPE: Primary | ICD-10-CM

## 2025-07-01 PROCEDURE — 99213 OFFICE O/P EST LOW 20 MIN: CPT

## 2025-07-01 RX ORDER — AMOXICILLIN 250 MG
1 CAPSULE ORAL 2 TIMES DAILY
Qty: 60 TABLET | Refills: 5 | Status: SHIPPED | OUTPATIENT
Start: 2025-07-01

## 2025-07-01 RX ORDER — BLOOD PRESSURE TEST KIT
KIT MISCELLANEOUS
Qty: 1 KIT | Refills: 0 | Status: SHIPPED | OUTPATIENT
Start: 2025-07-01

## 2025-07-01 RX ORDER — CLOPIDOGREL BISULFATE 75 MG/1
75 TABLET ORAL DAILY
Qty: 90 TABLET | Refills: 1 | Status: SHIPPED | OUTPATIENT
Start: 2025-07-01

## 2025-07-01 RX ORDER — AMOXICILLIN 250 MG
1 CAPSULE ORAL 2 TIMES DAILY
COMMUNITY
End: 2025-07-01 | Stop reason: SDUPTHER

## 2025-07-01 NOTE — PROGRESS NOTES
Jaison Ray is a 60 y.o. male    Chief Complaint   Patient presents with    Hypertension       \"Have you been to the ER, urgent care clinic since your last visit?  Hospitalized since your last visit?\"    NO    “Have you seen or consulted any other health care providers outside of [facility] since your last visit?”    NO    “Have you had a colorectal cancer screening such as a colonoscopy/FIT/Cologuard?    NO    No colonoscopy on file  No cologuard on file  No FIT/FOBT on file   No flexible sigmoidoscopy on file             There were no vitals filed for this visit.       No data to display              Health Maintenance Due   Topic Date Due    HIV screen  Never done    Diabetic retinal exam  Never done    Hepatitis C screen  Never done    Pneumococcal 50+ years Vaccine (1 of 2 - PCV) Never done    Colorectal Cancer Screen  Never done    Shingles vaccine (1 of 2) Never done    COVID-19 Vaccine (2 - 2024-25 season) 09/01/2024    Respiratory Syncytial Virus (RSV) Pregnant or age 60 yrs+ (1 - Risk 60-74 years 1-dose series) Never done    Diabetic Alb to Cr ratio (uACR) test  07/09/2025    Lipids  07/09/2025

## 2025-07-01 NOTE — PROGRESS NOTES
[address] [id number]    Office [phone], Fax [phone]      Subjective   Jaison Ray is a 60 y.o. male who presents for   Chief Complaint   Patient presents with    Hypertension     #HTN F/u  - BP in office: 130/76  - BP at home: does not have log. Been in 130s/70.   - Current medications: carvedilol 25mg, clonidine 0.2mg, amlodipine 10mg, lisinopril 40mg.   - Denies chest pain, vision changes, headache, SOB  - Diet: working on it. Wife cooks low sodium, low carbs/sugar.     #Constipation  - needs senna/docusate refill.    #HM  -  Lipid, A1c, MA/cr: declines labs today, would like to get next time.   - Colonscopy: has apt with GI dcotor.     # CVA:   - On DAPT currently for stroke  - Per cardiology note, right hemispheric stroke Oct 2024, unclear etiology. August carotid duplex with < 50% bilaterally. 14 day MCOT with no AF/VT seen.    Review of Systems   Per HPI. All other systems reviewed and are negative.    Health Maintenance:  Health Maintenance Due   Topic Date Due    HIV screen  Never done    Diabetic retinal exam  Never done    Hepatitis C screen  Never done    Pneumococcal 50+ years Vaccine (1 of 2 - PCV) Never done    Colorectal Cancer Screen  Never done    Shingles vaccine (1 of 2) Never done    COVID-19 Vaccine (2 - 2024-25 season) 09/01/2024    Respiratory Syncytial Virus (RSV) Pregnant or age 60 yrs+ (1 - Risk 60-74 years 1-dose series) Never done    Diabetic Alb to Cr ratio (uACR) test  07/09/2025    Lipids  07/09/2025        Medical History  Past Medical History:   Diagnosis Date    Diabetes mellitus (HCC)     Hyperlipidemia     Hypertension     Sleep apnea [id number]    Stroke (HCC)        Medications  Current Outpatient Medications   Medication Sig    senna-docusate (SENEXON-S) 8.6-50 MG per tablet Take 1 tablet by mouth 2 times daily    Blood Pressure KIT Please check your blood pressure twice a day and record the results in your log.    clopidogrel

## 2025-07-08 ENCOUNTER — TRANSCRIBE ORDERS (OUTPATIENT)
Facility: HOSPITAL | Age: 60
End: 2025-07-08

## 2025-07-08 DIAGNOSIS — N18.32 CHRONIC KIDNEY DISEASE, STAGE 3B (HCC): Primary | ICD-10-CM
